# Patient Record
Sex: FEMALE | Race: BLACK OR AFRICAN AMERICAN | NOT HISPANIC OR LATINO | Employment: UNEMPLOYED
[De-identification: names, ages, dates, MRNs, and addresses within clinical notes are randomized per-mention and may not be internally consistent; named-entity substitution may affect disease eponyms.]

---

## 2021-03-31 ENCOUNTER — TRANSCRIBE ORDERS (OUTPATIENT)
Dept: OTHER | Age: 36
End: 2021-03-31

## 2021-03-31 DIAGNOSIS — H05.20 EXOPHTHALMOS: Primary | ICD-10-CM

## 2021-05-28 ENCOUNTER — TELEPHONE (OUTPATIENT)
Dept: OPHTHALMOLOGY | Facility: CLINIC | Age: 36
End: 2021-05-28

## 2023-05-02 ENCOUNTER — OFFICE VISIT (OUTPATIENT)
Dept: OPHTHALMOLOGY | Facility: CLINIC | Age: 38
End: 2023-05-02
Attending: OPHTHALMOLOGY
Payer: COMMERCIAL

## 2023-05-02 DIAGNOSIS — E05.00 GRAVES DISEASE: ICD-10-CM

## 2023-05-02 DIAGNOSIS — E07.9 THYROID EYE DISEASE: ICD-10-CM

## 2023-05-02 DIAGNOSIS — E05.00 PROPTOSIS DUE TO THYROID DISORDER: Primary | ICD-10-CM

## 2023-05-02 DIAGNOSIS — H57.89 THYROID EYE DISEASE: ICD-10-CM

## 2023-05-02 PROCEDURE — 92285 EXTERNAL OCULAR PHOTOGRAPHY: CPT | Mod: 26 | Performed by: OPHTHALMOLOGY

## 2023-05-02 PROCEDURE — 92285 EXTERNAL OCULAR PHOTOGRAPHY: CPT | Performed by: OPHTHALMOLOGY

## 2023-05-02 PROCEDURE — 99203 OFFICE O/P NEW LOW 30 MIN: CPT | Mod: GC | Performed by: OPHTHALMOLOGY

## 2023-05-02 PROCEDURE — G0463 HOSPITAL OUTPT CLINIC VISIT: HCPCS | Performed by: OPHTHALMOLOGY

## 2023-05-02 RX ORDER — ERGOCALCIFEROL 1.25 MG/1
50000 CAPSULE, LIQUID FILLED ORAL WEEKLY
COMMUNITY
Start: 2022-08-23 | End: 2023-11-21

## 2023-05-02 RX ORDER — LEVOTHYROXINE SODIUM 112 UG/1
112 TABLET ORAL DAILY
COMMUNITY
Start: 2022-08-28

## 2023-05-02 ASSESSMENT — CONF VISUAL FIELD
OD_NORMAL: 1
OS_NORMAL: 1
OS_INFERIOR_TEMPORAL_RESTRICTION: 0
OS_SUPERIOR_TEMPORAL_RESTRICTION: 0
OD_SUPERIOR_TEMPORAL_RESTRICTION: 0
OD_INFERIOR_NASAL_RESTRICTION: 0
OS_SUPERIOR_NASAL_RESTRICTION: 0
OD_INFERIOR_TEMPORAL_RESTRICTION: 0
OS_INFERIOR_NASAL_RESTRICTION: 0
METHOD: COUNTING FINGERS
OD_SUPERIOR_NASAL_RESTRICTION: 0

## 2023-05-02 ASSESSMENT — REFRACTION_WEARINGRX
OS_SPHERE: +2.00
OD_AXIS: 090
OD_SPHERE: +1.25
OS_CYLINDER: SPHERE
SPECS_TYPE: SVL
OD_CYLINDER: +0.25

## 2023-05-02 ASSESSMENT — EXTERNAL EXAM - RIGHT EYE: OD_EXAM: NORMAL

## 2023-05-02 ASSESSMENT — TONOMETRY
OS_IOP_MMHG: 19
OD_IOP_MMHG: 16
IOP_METHOD: ICARE SOLID/B JC

## 2023-05-02 ASSESSMENT — EXTERNAL EXAM - LEFT EYE: OS_EXAM: NORMAL

## 2023-05-02 ASSESSMENT — LAGOPHTHALMOS
OS_LAGOPHTHALMOS: 0
OD_LAGOPHTHALMOS: 1

## 2023-05-02 ASSESSMENT — VISUAL ACUITY
OD_SC+: -3
OS_SC: 20/30
OS_SC+: -1
OD_CC: 20/20
OS_CC: 20/20
METHOD: SNELLEN - LINEAR
OD_SC: 20/20

## 2023-05-02 ASSESSMENT — CUP TO DISC RATIO
OD_RATIO: 0.3
OS_RATIO: 0.3

## 2023-05-02 ASSESSMENT — MARGIN REFLEX DISTANCE
OD_MRD2: 8
OS_MRD1: 5
OD_MRD1: 5
OS_MRD2: 8

## 2023-05-02 NOTE — PROGRESS NOTES
HPI:   Diagnosed with hyperthyroidism in 2016, was initially treated with methimazole but it was not improving so she underwent total thyroidectomy in 2020. She has seen a general eye doctor since being diagnosed with thyroid problems. Does not have blurry vision in glasses from about a year ago. Has significant light sensitivity, eye watering, eye pain. She also has pressure/pain behind the eyes. She also has FBS and severe burning. Does not use eye drops. These symptoms have been progressively worsening. She also has bilateral eye bulging that improved since surgery (thyroidectomy).    Referring physician: Jane Pryor NP    Thyroid history:  Diagnosed when? 2016  VARGAS: no  Thyroidectomy: October 2020  Takes levothyroxine 112 mcg daily    TSI- 16.4 10/2019    Eye symptoms (since when): initial  Proptosis (better/worse/same since last visit): yes  Diplopia(better/worse/same since last visit): no  Eyelid retraction(better/worse/same since last visit): yes  Tearing(better/worse/same since last visit): yes  Redness (better/worse/same since last visit): yes  Pain ((better/worse/same since last visit): yes  Pain to move the eyes (better/worse/same since last visit): yes  Blurred vision: no    Ocular history:   Orbital decompression (date, details): no  Strabismus surgery (date, details): no  Eyelid surgery (date, details): no    Exam:   Stephanie (base): 100/26/26    Better/worse same: initial  Strabismus (better/worse/same): no  Eyelid retraction (better/worse/same): yes, initial    JORGE L Score:  1. Spontaneous orbital pain.     1  2. Gaze evoked orbital pain.     1  3. Eyelid swelling due to active thyroid eye disease  1  4. Eyelid erythema.      0  5. Conjunctival redness due to active thyroid eye disease . 1  6. Chemosis.        0  7. Inflammation of caruncle OR plica.   +/- 0-1    Patients assessed after follow-up can be scored out of 10 by  including items 8-10.    8. Increase of > 2mm in proptosis.    n/a   9.  Decrease in uniocular excursion in any direction of > 8 . n/a  10. Decrease of acuity equivalent to 1 Snellen line.  n/a    JORGE L SCORE = 4-5/7    Ramos Diplopia Score = 0  0 = no diplopia  1 = intermittent (when tired, upon waking, end of day etc)  2 = inconstant (extreme gazes)  3 = constant      Lanie Manuel is a 37 year old female with the following diagnoses:   1. Thyroid eye disease    2. Graves disease       Not clear that this patient is active.  Our suspicion is that she has chronic inactive thyroid eye disease with chronic symptoms of ocular surface exposure and some chronic orbital congestion. Discussed Tepezza for chronic thyroid eye disease versus observation with surgery.  Will check TSI and repeat exam in 4 months. If stable then consider orbital decompression surgery.       Vira Asher MD  PGY2 Ophthalmology    35 minutes were spent on the date of the encounter by me doing chart review, history and exam, documentation, and further activities as noted above    Complete documentation of historical and exam elements from today's encounter can be found in the full encounter summary report (not reduplicated in this progress note).  I personally obtained the chief complaint(s) and history of present illness.  I confirmed and edited as necessary the review of systems, past medical/surgical history, family history, social history, and examination findings as documented by others; and I examined the patient myself.  I personally reviewed the relevant tests, images, and reports as documented above.  I formulated and edited as necessary the assessment and plan and discussed the findings and management plan with the patient and family.  I personally reviewed the ophthalmic test(s) associated with this encounter, agree with the interpretation(s) as documented by the resident/fellow, and have edited the corresponding report(s) as necessary.     Ayden Ribeiro MD

## 2023-05-02 NOTE — NURSING NOTE
Chief Complaint(s) and History of Present Illness(es)     Graves Thyrotoxic Exoph            Laterality: both eyes    Associated symptoms: eye pain, blurred vision and headaches.  Negative for color vision changes    Comments: Diagnosed with Graves disease in 2016. Currently hypothyroid after thyroidectomy in 2020. Takes levothyroxine 112mg daily.   No current labs, will be getting them done this month but are not scheduled yet.  Had eye symptoms right after diagnosis (proptosis, dryness, tearing, pressure, light sensitivity, pain, headaches) constant since onset.  After surgery, proptosis not as bad but other symptoms remain. Vision D/N has gotten worse over last 1-2 years. No color vision changes.  No drops. No diplopia.          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  Inf: pt

## 2023-11-21 ENCOUNTER — HOSPITAL ENCOUNTER (INPATIENT)
Facility: HOSPITAL | Age: 38
LOS: 6 days | Discharge: HOME | End: 2023-11-27
Attending: EMERGENCY MEDICINE | Admitting: INTERNAL MEDICINE
Payer: COMMERCIAL

## 2023-11-21 ENCOUNTER — APPOINTMENT (OUTPATIENT)
Dept: CT IMAGING | Facility: HOSPITAL | Age: 38
End: 2023-11-21
Attending: PHYSICIAN ASSISTANT
Payer: COMMERCIAL

## 2023-11-21 DIAGNOSIS — N10 PYELONEPHRITIS, ACUTE: ICD-10-CM

## 2023-11-21 DIAGNOSIS — A41.9 SEPSIS (H): ICD-10-CM

## 2023-11-21 PROBLEM — E05.00 GRAVES' OPHTHALMOPATHY: Status: ACTIVE | Noted: 2019-11-27

## 2023-11-21 PROBLEM — O09.90 SUPERVISION OF HIGH RISK PREGNANCY, UNSPECIFIED, UNSPECIFIED TRIMESTER: Status: ACTIVE | Noted: 2019-07-05

## 2023-11-21 PROBLEM — E05.00 GRAVES DISEASE: Status: ACTIVE | Noted: 2017-06-07

## 2023-11-21 PROBLEM — E66.9 OBESITY (BMI 35.0-39.9 WITHOUT COMORBIDITY): Status: ACTIVE | Noted: 2023-08-18

## 2023-11-21 PROBLEM — E83.51 HYPOCALCEMIA: Status: ACTIVE | Noted: 2020-10-20

## 2023-11-21 PROBLEM — C73 THYROID CANCER (H): Chronic | Status: ACTIVE | Noted: 2020-10-20

## 2023-11-21 LAB
ALBUMIN SERPL BCG-MCNC: 4.2 G/DL (ref 3.5–5.2)
ALBUMIN UR-MCNC: 10 MG/DL
ALP SERPL-CCNC: 184 U/L (ref 40–150)
ALT SERPL W P-5'-P-CCNC: 30 U/L (ref 0–50)
ANION GAP SERPL CALCULATED.3IONS-SCNC: 15 MMOL/L (ref 7–15)
APPEARANCE UR: CLEAR
AST SERPL W P-5'-P-CCNC: 37 U/L (ref 0–45)
BACTERIA #/AREA URNS HPF: ABNORMAL /HPF
BASOPHILS # BLD AUTO: ABNORMAL 10*3/UL
BASOPHILS # BLD MANUAL: 0 10E3/UL (ref 0–0.2)
BASOPHILS NFR BLD AUTO: ABNORMAL %
BASOPHILS NFR BLD MANUAL: 0 %
BILIRUB DIRECT SERPL-MCNC: <0.2 MG/DL (ref 0–0.3)
BILIRUB SERPL-MCNC: 0.6 MG/DL
BILIRUB UR QL STRIP: NEGATIVE
BUN SERPL-MCNC: 9.5 MG/DL (ref 6–20)
CALCIUM SERPL-MCNC: 9.8 MG/DL (ref 8.6–10)
CHLORIDE SERPL-SCNC: 91 MMOL/L (ref 98–107)
COLOR UR AUTO: COLORLESS
CREAT SERPL-MCNC: 0.94 MG/DL (ref 0.51–0.95)
DEPRECATED HCO3 PLAS-SCNC: 26 MMOL/L (ref 22–29)
EGFRCR SERPLBLD CKD-EPI 2021: 78 ML/MIN/1.73M2
EOSINOPHIL # BLD AUTO: ABNORMAL 10*3/UL
EOSINOPHIL # BLD MANUAL: 0 10E3/UL (ref 0–0.7)
EOSINOPHIL NFR BLD AUTO: ABNORMAL %
EOSINOPHIL NFR BLD MANUAL: 0 %
ERYTHROCYTE [DISTWIDTH] IN BLOOD BY AUTOMATED COUNT: 11.9 % (ref 10–15)
GLUCOSE SERPL-MCNC: 120 MG/DL (ref 70–99)
GLUCOSE UR STRIP-MCNC: NEGATIVE MG/DL
HCG SERPL QL: NEGATIVE
HCT VFR BLD AUTO: 38.1 % (ref 35–47)
HGB BLD-MCNC: 13.5 G/DL (ref 11.7–15.7)
HGB UR QL STRIP: NEGATIVE
IMM GRANULOCYTES # BLD: ABNORMAL 10*3/UL
IMM GRANULOCYTES NFR BLD: ABNORMAL %
KETONES UR STRIP-MCNC: 10 MG/DL
LACTATE SERPL-SCNC: 1.1 MMOL/L (ref 0.7–2)
LACTATE SERPL-SCNC: 2.4 MMOL/L (ref 0.7–2)
LACTATE SERPL-SCNC: 2.4 MMOL/L (ref 0.7–2)
LEUKOCYTE ESTERASE UR QL STRIP: ABNORMAL
LIPASE SERPL-CCNC: 9 U/L (ref 13–60)
LYMPHOCYTES # BLD AUTO: ABNORMAL 10*3/UL
LYMPHOCYTES # BLD MANUAL: 1.4 10E3/UL (ref 0.8–5.3)
LYMPHOCYTES NFR BLD AUTO: ABNORMAL %
LYMPHOCYTES NFR BLD MANUAL: 6 %
MCH RBC QN AUTO: 29.1 PG (ref 26.5–33)
MCHC RBC AUTO-ENTMCNC: 35.4 G/DL (ref 31.5–36.5)
MCV RBC AUTO: 82 FL (ref 78–100)
MONOCYTES # BLD AUTO: ABNORMAL 10*3/UL
MONOCYTES # BLD MANUAL: 1 10E3/UL (ref 0–1.3)
MONOCYTES NFR BLD AUTO: ABNORMAL %
MONOCYTES NFR BLD MANUAL: 4 %
MUCOUS THREADS #/AREA URNS LPF: PRESENT /LPF
NEUTROPHILS # BLD AUTO: ABNORMAL 10*3/UL
NEUTROPHILS # BLD MANUAL: 21.4 10E3/UL (ref 1.6–8.3)
NEUTROPHILS NFR BLD AUTO: ABNORMAL %
NEUTROPHILS NFR BLD MANUAL: 90 %
NITRATE UR QL: POSITIVE
NRBC # BLD AUTO: 0 10E3/UL
NRBC BLD AUTO-RTO: 0 /100
PH UR STRIP: 7 [PH] (ref 5–7)
PLAT MORPH BLD: ABNORMAL
PLATELET # BLD AUTO: 392 10E3/UL (ref 150–450)
POTASSIUM SERPL-SCNC: 3.5 MMOL/L (ref 3.4–5.3)
PROT SERPL-MCNC: 8.7 G/DL (ref 6.4–8.3)
RBC # BLD AUTO: 4.64 10E6/UL (ref 3.8–5.2)
RBC MORPH BLD: ABNORMAL
RBC URINE: 5 /HPF
SODIUM SERPL-SCNC: 132 MMOL/L (ref 135–145)
SP GR UR STRIP: 1.03 (ref 1–1.03)
TSH SERPL DL<=0.005 MIU/L-ACNC: 1.23 UIU/ML (ref 0.3–4.2)
UROBILINOGEN UR STRIP-MCNC: <2 MG/DL
WBC # BLD AUTO: 23.8 10E3/UL (ref 4–11)
WBC URINE: 28 /HPF

## 2023-11-21 PROCEDURE — 82248 BILIRUBIN DIRECT: CPT | Performed by: PHYSICIAN ASSISTANT

## 2023-11-21 PROCEDURE — 250N000013 HC RX MED GY IP 250 OP 250 PS 637: Performed by: INTERNAL MEDICINE

## 2023-11-21 PROCEDURE — 87086 URINE CULTURE/COLONY COUNT: CPT | Performed by: PHYSICIAN ASSISTANT

## 2023-11-21 PROCEDURE — 120N000001 HC R&B MED SURG/OB

## 2023-11-21 PROCEDURE — 83605 ASSAY OF LACTIC ACID: CPT | Performed by: INTERNAL MEDICINE

## 2023-11-21 PROCEDURE — 83690 ASSAY OF LIPASE: CPT | Performed by: PHYSICIAN ASSISTANT

## 2023-11-21 PROCEDURE — 99223 1ST HOSP IP/OBS HIGH 75: CPT | Performed by: INTERNAL MEDICINE

## 2023-11-21 PROCEDURE — 258N000003 HC RX IP 258 OP 636: Performed by: PHYSICIAN ASSISTANT

## 2023-11-21 PROCEDURE — 250N000011 HC RX IP 250 OP 636: Mod: JZ | Performed by: PHYSICIAN ASSISTANT

## 2023-11-21 PROCEDURE — 80053 COMPREHEN METABOLIC PANEL: CPT | Performed by: PHYSICIAN ASSISTANT

## 2023-11-21 PROCEDURE — 250N000011 HC RX IP 250 OP 636: Performed by: EMERGENCY MEDICINE

## 2023-11-21 PROCEDURE — 87149 DNA/RNA DIRECT PROBE: CPT | Performed by: PHYSICIAN ASSISTANT

## 2023-11-21 PROCEDURE — 99285 EMERGENCY DEPT VISIT HI MDM: CPT | Mod: 25

## 2023-11-21 PROCEDURE — 250N000011 HC RX IP 250 OP 636: Mod: JZ | Performed by: INTERNAL MEDICINE

## 2023-11-21 PROCEDURE — 36415 COLL VENOUS BLD VENIPUNCTURE: CPT | Performed by: INTERNAL MEDICINE

## 2023-11-21 PROCEDURE — 83605 ASSAY OF LACTIC ACID: CPT | Performed by: PHYSICIAN ASSISTANT

## 2023-11-21 PROCEDURE — 36415 COLL VENOUS BLD VENIPUNCTURE: CPT | Performed by: PHYSICIAN ASSISTANT

## 2023-11-21 PROCEDURE — 258N000003 HC RX IP 258 OP 636: Performed by: INTERNAL MEDICINE

## 2023-11-21 PROCEDURE — 81001 URINALYSIS AUTO W/SCOPE: CPT | Performed by: PHYSICIAN ASSISTANT

## 2023-11-21 PROCEDURE — 74177 CT ABD & PELVIS W/CONTRAST: CPT

## 2023-11-21 PROCEDURE — 96375 TX/PRO/DX INJ NEW DRUG ADDON: CPT

## 2023-11-21 PROCEDURE — 96365 THER/PROPH/DIAG IV INF INIT: CPT | Mod: 59

## 2023-11-21 PROCEDURE — 87077 CULTURE AEROBIC IDENTIFY: CPT | Performed by: PHYSICIAN ASSISTANT

## 2023-11-21 PROCEDURE — 85027 COMPLETE CBC AUTOMATED: CPT | Performed by: PHYSICIAN ASSISTANT

## 2023-11-21 PROCEDURE — 85007 BL SMEAR W/DIFF WBC COUNT: CPT | Performed by: PHYSICIAN ASSISTANT

## 2023-11-21 PROCEDURE — 84443 ASSAY THYROID STIM HORMONE: CPT | Performed by: PHYSICIAN ASSISTANT

## 2023-11-21 PROCEDURE — 84703 CHORIONIC GONADOTROPIN ASSAY: CPT | Performed by: PHYSICIAN ASSISTANT

## 2023-11-21 PROCEDURE — 96361 HYDRATE IV INFUSION ADD-ON: CPT

## 2023-11-21 RX ORDER — IOPAMIDOL 755 MG/ML
90 INJECTION, SOLUTION INTRAVASCULAR ONCE
Status: COMPLETED | OUTPATIENT
Start: 2023-11-21 | End: 2023-11-21

## 2023-11-21 RX ORDER — SPIRONOLACTONE 50 MG/1
50 TABLET, FILM COATED ORAL EVERY MORNING
COMMUNITY

## 2023-11-21 RX ORDER — TRETINOIN 0.25 MG/G
CREAM TOPICAL AT BEDTIME
COMMUNITY

## 2023-11-21 RX ORDER — SPIRONOLACTONE 25 MG/1
50 TABLET ORAL EVERY MORNING
Status: DISCONTINUED | OUTPATIENT
Start: 2023-11-22 | End: 2023-11-27 | Stop reason: HOSPADM

## 2023-11-21 RX ORDER — AZELAIC ACID 0.15 G/G
GEL TOPICAL EVERY MORNING
COMMUNITY

## 2023-11-21 RX ORDER — ONDANSETRON 2 MG/ML
4 INJECTION INTRAMUSCULAR; INTRAVENOUS EVERY 6 HOURS PRN
Status: DISCONTINUED | OUTPATIENT
Start: 2023-11-21 | End: 2023-11-23

## 2023-11-21 RX ORDER — POLYETHYLENE GLYCOL 3350 17 G/17G
17 POWDER, FOR SOLUTION ORAL DAILY
Status: DISCONTINUED | OUTPATIENT
Start: 2023-11-21 | End: 2023-11-26

## 2023-11-21 RX ORDER — PHENTERMINE HYDROCHLORIDE 37.5 MG/1
37.5 CAPSULE ORAL EVERY MORNING
COMMUNITY

## 2023-11-21 RX ORDER — FENTANYL CITRATE 50 UG/ML
100 INJECTION, SOLUTION INTRAMUSCULAR; INTRAVENOUS ONCE
Status: COMPLETED | OUTPATIENT
Start: 2023-11-21 | End: 2023-11-21

## 2023-11-21 RX ORDER — VALACYCLOVIR HYDROCHLORIDE 500 MG/1
1000 TABLET, FILM COATED ORAL DAILY
Status: DISCONTINUED | OUTPATIENT
Start: 2023-11-21 | End: 2023-11-27 | Stop reason: HOSPADM

## 2023-11-21 RX ORDER — SODIUM CHLORIDE 9 MG/ML
INJECTION, SOLUTION INTRAVENOUS CONTINUOUS
Status: ACTIVE | OUTPATIENT
Start: 2023-11-21 | End: 2023-11-22

## 2023-11-21 RX ORDER — VALACYCLOVIR HYDROCHLORIDE 1 G/1
1000 TABLET, FILM COATED ORAL DAILY
COMMUNITY

## 2023-11-21 RX ORDER — SPIRONOLACTONE 100 MG/1
100 TABLET, FILM COATED ORAL
Status: DISCONTINUED | OUTPATIENT
Start: 2023-11-21 | End: 2023-11-27 | Stop reason: HOSPADM

## 2023-11-21 RX ORDER — PIPERACILLIN SODIUM, TAZOBACTAM SODIUM 3; .375 G/15ML; G/15ML
3.38 INJECTION, POWDER, LYOPHILIZED, FOR SOLUTION INTRAVENOUS ONCE
Status: COMPLETED | OUTPATIENT
Start: 2023-11-21 | End: 2023-11-21

## 2023-11-21 RX ORDER — MORPHINE SULFATE 4 MG/ML
4 INJECTION, SOLUTION INTRAMUSCULAR; INTRAVENOUS ONCE
Status: COMPLETED | OUTPATIENT
Start: 2023-11-21 | End: 2023-11-21

## 2023-11-21 RX ORDER — AMOXICILLIN 250 MG
1 CAPSULE ORAL 2 TIMES DAILY PRN
Status: DISCONTINUED | OUTPATIENT
Start: 2023-11-21 | End: 2023-11-27 | Stop reason: HOSPADM

## 2023-11-21 RX ORDER — HYDROMORPHONE HYDROCHLORIDE 1 MG/ML
0.5 INJECTION, SOLUTION INTRAMUSCULAR; INTRAVENOUS; SUBCUTANEOUS ONCE
Status: COMPLETED | OUTPATIENT
Start: 2023-11-21 | End: 2023-11-21

## 2023-11-21 RX ORDER — SPIRONOLACTONE 50 MG/1
100 TABLET, FILM COATED ORAL
COMMUNITY

## 2023-11-21 RX ORDER — LIDOCAINE 40 MG/G
CREAM TOPICAL
Status: DISCONTINUED | OUTPATIENT
Start: 2023-11-21 | End: 2023-11-27 | Stop reason: HOSPADM

## 2023-11-21 RX ORDER — AMOXICILLIN 250 MG
2 CAPSULE ORAL 2 TIMES DAILY PRN
Status: DISCONTINUED | OUTPATIENT
Start: 2023-11-21 | End: 2023-11-27 | Stop reason: HOSPADM

## 2023-11-21 RX ORDER — PIPERACILLIN SODIUM, TAZOBACTAM SODIUM 3; .375 G/15ML; G/15ML
3.38 INJECTION, POWDER, LYOPHILIZED, FOR SOLUTION INTRAVENOUS EVERY 8 HOURS
Status: DISCONTINUED | OUTPATIENT
Start: 2023-11-21 | End: 2023-11-23

## 2023-11-21 RX ORDER — ACETAMINOPHEN 325 MG/1
650 TABLET ORAL EVERY 4 HOURS PRN
Status: DISCONTINUED | OUTPATIENT
Start: 2023-11-21 | End: 2023-11-22

## 2023-11-21 RX ORDER — CALCIUM CARBONATE 500 MG/1
1000 TABLET, CHEWABLE ORAL 4 TIMES DAILY PRN
Status: DISCONTINUED | OUTPATIENT
Start: 2023-11-21 | End: 2023-11-26

## 2023-11-21 RX ORDER — HYDROMORPHONE HYDROCHLORIDE 1 MG/ML
0.5 INJECTION, SOLUTION INTRAMUSCULAR; INTRAVENOUS; SUBCUTANEOUS
Status: DISCONTINUED | OUTPATIENT
Start: 2023-11-21 | End: 2023-11-27 | Stop reason: HOSPADM

## 2023-11-21 RX ORDER — LEVOTHYROXINE SODIUM 112 UG/1
112 TABLET ORAL DAILY
Status: DISCONTINUED | OUTPATIENT
Start: 2023-11-21 | End: 2023-11-27 | Stop reason: HOSPADM

## 2023-11-21 RX ORDER — ONDANSETRON 4 MG/1
4 TABLET, ORALLY DISINTEGRATING ORAL EVERY 6 HOURS PRN
Status: DISCONTINUED | OUTPATIENT
Start: 2023-11-21 | End: 2023-11-23

## 2023-11-21 RX ADMIN — IOPAMIDOL 90 ML: 755 INJECTION, SOLUTION INTRAVENOUS at 11:23

## 2023-11-21 RX ADMIN — ONDANSETRON 4 MG: 2 INJECTION INTRAMUSCULAR; INTRAVENOUS at 14:05

## 2023-11-21 RX ADMIN — SPIRONOLACTONE 100 MG: 100 TABLET ORAL at 16:30

## 2023-11-21 RX ADMIN — SODIUM CHLORIDE 1000 ML: 9 INJECTION, SOLUTION INTRAVENOUS at 10:54

## 2023-11-21 RX ADMIN — FENTANYL CITRATE 100 MCG: 50 INJECTION, SOLUTION INTRAMUSCULAR; INTRAVENOUS at 09:38

## 2023-11-21 RX ADMIN — PIPERACILLIN AND TAZOBACTAM 3.38 G: 3; .375 INJECTION, POWDER, FOR SOLUTION INTRAVENOUS at 10:52

## 2023-11-21 RX ADMIN — MORPHINE SULFATE 4 MG: 4 INJECTION, SOLUTION INTRAMUSCULAR; INTRAVENOUS at 11:54

## 2023-11-21 RX ADMIN — SODIUM CHLORIDE: 9 INJECTION, SOLUTION INTRAVENOUS at 14:02

## 2023-11-21 RX ADMIN — HYDROMORPHONE HYDROCHLORIDE 0.5 MG: 1 INJECTION, SOLUTION INTRAMUSCULAR; INTRAVENOUS; SUBCUTANEOUS at 14:02

## 2023-11-21 RX ADMIN — SODIUM CHLORIDE 1000 ML: 9 INJECTION, SOLUTION INTRAVENOUS at 09:36

## 2023-11-21 RX ADMIN — HYDROMORPHONE HYDROCHLORIDE 0.5 MG: 1 INJECTION, SOLUTION INTRAMUSCULAR; INTRAVENOUS; SUBCUTANEOUS at 20:30

## 2023-11-21 RX ADMIN — HYDROMORPHONE HYDROCHLORIDE 0.5 MG: 1 INJECTION, SOLUTION INTRAMUSCULAR; INTRAVENOUS; SUBCUTANEOUS at 10:27

## 2023-11-21 RX ADMIN — LEVOTHYROXINE SODIUM 112 MCG: 0.11 TABLET ORAL at 15:02

## 2023-11-21 RX ADMIN — HYDROMORPHONE HYDROCHLORIDE 0.5 MG: 1 INJECTION, SOLUTION INTRAMUSCULAR; INTRAVENOUS; SUBCUTANEOUS at 16:37

## 2023-11-21 RX ADMIN — VALACYCLOVIR HYDROCHLORIDE 1000 MG: 500 TABLET, FILM COATED ORAL at 20:56

## 2023-11-21 RX ADMIN — ACETAMINOPHEN 650 MG: 325 TABLET ORAL at 17:35

## 2023-11-21 RX ADMIN — PIPERACILLIN AND TAZOBACTAM 3.38 G: 3; .375 INJECTION, POWDER, LYOPHILIZED, FOR SOLUTION INTRAVENOUS at 16:30

## 2023-11-21 RX ADMIN — SODIUM CHLORIDE 1000 ML: 9 INJECTION, SOLUTION INTRAVENOUS at 12:51

## 2023-11-21 ASSESSMENT — ACTIVITIES OF DAILY LIVING (ADL)
ADLS_ACUITY_SCORE: 35
ADLS_ACUITY_SCORE: 35
ADLS_ACUITY_SCORE: 20
ADLS_ACUITY_SCORE: 35
ADLS_ACUITY_SCORE: 35
ADLS_ACUITY_SCORE: 20
ADLS_ACUITY_SCORE: 35

## 2023-11-21 NOTE — ED TRIAGE NOTES
Patient to triage via wheelchair, states 4 day history of lower abdominal pain, NV loose stools. No blood in stool. Has not been eating. Very restless in chair

## 2023-11-21 NOTE — ED NOTES
"Emergency Department Midlevel Supervisory Note     I personally saw the patient and performed a substantive portion of the visit including all aspects of the medical decision making.    ED Course:  9:37 AM  Ascencion Little PA-C staffed patient with me. I agree with their assessment and plan of management, and I will see the patient.  9:46 AM  I met with the patient to introduce myself, gather additional history, perform my initial exam, and discuss the plan.     Brief HPI:     Lanie Manuel is a 38 year old female who presents for evaluation of RLQ abdominal pain, nausea, and vomiting since 11/17. Denies fever, urinary symptoms, hematemesis, hematochezia.     I, Amina Contreras, am serving as a scribe to document services personally performed by Rc Higgins MD based on my observations and the provider's statements to me.   I, Rc Higigns MD, attest that Amina Contreras was acting in a scribe capacity, has observed my performance of the services and has documented them in accordance with my direction.    Brief Physical Exam:    Vitals: BP (!) 198/81   Pulse 95   Temp 98.9  F (37.2  C) (Oral)   Resp 22   Ht 1.626 m (5' 4\")   Wt 95.3 kg (210 lb)   LMP 11/14/2023   SpO2 99%   BMI 36.05 kg/m    General: Appears in no acute distress, awake, alert, interactive.  Eyes: Conjunctivae non-injected. Sclera anicteric.  HENT: Atraumatic.  Neck: Supple.  Respiratory/Chest: Respiration unlabored.  Abdomen: non distended, right sided abdominal tenderness  Musculoskeletal: Normal extremities. No edema or erythema.  Skin: Normal color. No rash or diaphoresis.  Neurologic: Face symmetric, moves all extremities spontaneously. Speech clear.  Psychiatric: Oriented to person, place, and time. Affect appropriate.       MDM:    ED Course as of 11/21/23 1312   Tue Nov 21, 2023   0927 Patient complains of diffuse abdominal pain, as well as pain across her chest.  2-year-old female, with past medical history of Graves' " disease, thyroid disorder, who presents emergency department for evaluation of diffuse abdominal pain, nausea and vomiting.  She reported has not been eating very much.  Very restlessly on examination.  On arrival here, borderline hypotensive, tachycardic and borderline febrile.  On exam has diffuse abdominal pain, without any significant rebound or guarding.  Pain primarily isolated over the right lower quadrant.  Patient does still have her appendix in place, she does not have her gallbladder.  Denies any other significant past medical history.  Denies shortness of breath.  Differential at this point is exceedingly broad given diffuse nature of pain includes possible retained stone, pancreatitis, bowel obstruction, gastritis, diverticulitis, less likely perforation.  Additional differential possible for appendicitis.  No history of kidney stones in the past, however patient does have right-sided lower abdominal pain, with some pain radiating into her right lower back, not particularly consistent with CVA tenderness, however will obtain urinalysis.  Plan for basic labs, pain medications, CT abdomen pelvis.  She denies any chance of pregnancy.   0943 Lactate here in the emergency department mildly elevated at 2.4.  Could be secondary to nausea vomiting, dehydration   0945 I met with patient and her hypotension is resolved.  Pain right lower quadrant.  Differential includes appendicitis, ileitis, PID, ovarian process, diverticulitis, infected kidney stone, PID, UTI   0949 Significant leukocytosis here in the emergency department with elevated lactate, blood cultures ordered, she will get the full 30/kg of sepsis fluids   0955 HCG Qualitative Serum: Negative   1035 TSH here completely unremarkable   1126 I have independently reviewed the CT abdomen pelvis, I do not appreciate any significant perforations, though pending final radiology read.   1218 UA here does show positive for nitrites, leukocyte esterase, some  bacteria, as well as some white blood cells, concerning for possible pyelonephritis given patient's abdominal pain   1230 CT Abdomen Pelvis w Contrast  Patient with severe right-sided pyelonephritis without abscess, no renal stones or obstruction.  Otherwise normal CT scan.   1234 Lactic unchanged here despite 30ml/kg bolus here.  CT scan shows right-sided severe pyelonephritis without any abscess.  Patient given Zosyn originally here in the emergency department.  Discussed plan for likely admission giving significant discomfort, and continued elevated lactate.  Laboratory studies otherwise unremarkable.   CT shows changes of pyelonephritis  Concerns for sepsis we will plan to admit for IV antibiotics.  Hypotension resolved.  Given sepsis boluses of fluid but still has elevated lactic acid.  Plan for admission.  Zosyn given.    1. Pyelonephritis, acute    2. Sepsis (H)        Labs and Imaging:  Results for orders placed or performed during the hospital encounter of 11/21/23   CT Abdomen Pelvis w Contrast    Impression    IMPRESSION:   1.  Severe right pyelonephritis without abscess.    2.  No renal stones or obstruction.   UA with Microscopic reflex to Culture    Specimen: Urine, Clean Catch   Result Value Ref Range    Color Urine Colorless Colorless, Straw, Light Yellow, Yellow    Appearance Urine Clear Clear    Glucose Urine Negative Negative mg/dL    Bilirubin Urine Negative Negative    Ketones Urine 10 (A) Negative mg/dL    Specific Gravity Urine 1.033 (H) 1.001 - 1.030    Blood Urine Negative Negative    pH Urine 7.0 5.0 - 7.0    Protein Albumin Urine 10 (A) Negative mg/dL    Urobilinogen Urine <2.0 <2.0 mg/dL    Nitrite Urine Positive (A) Negative    Leukocyte Esterase Urine 250 Susanne/uL (A) Negative    Bacteria Urine Few (A) None Seen /HPF    Mucus Urine Present (A) None Seen /LPF    RBC Urine 5 (H) <=2 /HPF    WBC Urine 28 (H) <=5 /HPF   Lactic acid whole blood   Result Value Ref Range    Lactic Acid 2.4 (H)  0.7 - 2.0 mmol/L   Basic metabolic panel   Result Value Ref Range    Sodium 132 (L) 135 - 145 mmol/L    Potassium 3.5 3.4 - 5.3 mmol/L    Chloride 91 (L) 98 - 107 mmol/L    Carbon Dioxide (CO2) 26 22 - 29 mmol/L    Anion Gap 15 7 - 15 mmol/L    Urea Nitrogen 9.5 6.0 - 20.0 mg/dL    Creatinine 0.94 0.51 - 0.95 mg/dL    GFR Estimate 78 >60 mL/min/1.73m2    Calcium 9.8 8.6 - 10.0 mg/dL    Glucose 120 (H) 70 - 99 mg/dL   Hepatic function panel   Result Value Ref Range    Protein Total 8.7 (H) 6.4 - 8.3 g/dL    Albumin 4.2 3.5 - 5.2 g/dL    Bilirubin Total 0.6 <=1.2 mg/dL    Alkaline Phosphatase 184 (H) 40 - 150 U/L    AST 37 0 - 45 U/L    ALT 30 0 - 50 U/L    Bilirubin Direct <0.20 0.00 - 0.30 mg/dL   Result Value Ref Range    Lipase 9 (L) 13 - 60 U/L   HCG qualitative   Result Value Ref Range    hCG Serum Qualitative Negative Negative   CBC with platelets and differential   Result Value Ref Range    WBC Count 23.8 (H) 4.0 - 11.0 10e3/uL    RBC Count 4.64 3.80 - 5.20 10e6/uL    Hemoglobin 13.5 11.7 - 15.7 g/dL    Hematocrit 38.1 35.0 - 47.0 %    MCV 82 78 - 100 fL    MCH 29.1 26.5 - 33.0 pg    MCHC 35.4 31.5 - 36.5 g/dL    RDW 11.9 10.0 - 15.0 %    Platelet Count 392 150 - 450 10e3/uL    % Neutrophils      % Lymphocytes      % Monocytes      % Eosinophils      % Basophils      % Immature Granulocytes      NRBCs per 100 WBC 0 <1 /100    Absolute Neutrophils      Absolute Lymphocytes      Absolute Monocytes      Absolute Eosinophils      Absolute Basophils      Absolute Immature Granulocytes      Absolute NRBCs 0.0 10e3/uL   TSH with free T4 reflex   Result Value Ref Range    TSH 1.23 0.30 - 4.20 uIU/mL   Lactic acid whole blood   Result Value Ref Range    Lactic Acid 2.4 (H) 0.7 - 2.0 mmol/L   Manual Differential   Result Value Ref Range    % Neutrophils 90 %    % Lymphocytes 6 %    % Monocytes 4 %    % Eosinophils 0 %    % Basophils 0 %    Absolute Neutrophils 21.4 (H) 1.6 - 8.3 10e3/uL    Absolute Lymphocytes 1.4 0.8 -  5.3 10e3/uL    Absolute Monocytes 1.0 0.0 - 1.3 10e3/uL    Absolute Eosinophils 0.0 0.0 - 0.7 10e3/uL    Absolute Basophils 0.0 0.0 - 0.2 10e3/uL    RBC Morphology Confirmed RBC Indices     Platelet Assessment  Automated Count Confirmed. Platelet morphology is normal.     Automated Count Confirmed. Platelet morphology is normal.     I have reviewed the relevant laboratory and radiology studies    Procedures:  I was present for the key portions of this procedure: none    Rc Higgins MD   [facility] EMERGENCY DEPARTMENT  [address] [place] [id number] [phone]     Rc Higgins MD  11/21/23 2556

## 2023-11-21 NOTE — PHARMACY-ADMISSION MEDICATION HISTORY
Pharmacist Admission Medication History    Admission medication history is complete. The information provided in this note is only as accurate as the sources available at the time of the update.    Information Source(s): Patient, Clinic records, and [facility]    Pertinent Information: Patient stated that she has     Changes made to PTA medication list:  Added: all meds are new to the [facility]  Deleted: None  Changed: None         Allergies reviewed with patient and updates made in EHR: yes    Medication History Completed By: Fredrick Calabrese [facility] 11/21/2023 11:41 AM    [facility] Med List   Medication Sig Last Dose    azelaic acid (FINACIA) 15 % external gel Apply topically every morning Past Week    levothyroxine (SYNTHROID/LEVOTHROID) 112 MCG tablet Take 112 mcg by mouth daily Past Week    phentermine (ADIPEX-P) 37.5 MG capsule Take 37.5 mg by mouth every morning Past Week    spironolactone (ALDACTONE) 50 MG tablet Take 50 mg by mouth every morning Past Week    spironolactone (ALDACTONE) 50 MG tablet Take 100 mg by mouth daily (with dinner) Past Week    tretinoin (RETIN-A) 0.025 % external cream Apply topically at bedtime Past Week    valACYclovir (VALTREX) 1000 mg tablet Take 1,000 mg by mouth daily Past Week

## 2023-11-21 NOTE — ED PROVIDER NOTES
EMERGENCY DEPARTMENT ENCOUNTER      NAME: Lanie Manuel  AGE: 38 year old female  YOB: 1985  MRN: [id number]  EVALUATION DATE & TIME: No admission date for patient encounter.    PCP: Jane Pryor (Inactive)    ED PROVIDER: Reno Little PA-C      Chief Complaint   Patient presents with    Abdominal Pain         FINAL IMPRESSION:  1. Pyelonephritis, acute    2. Sepsis (H)        ED COURSE & MEDICAL DECISION MAKING:    Pertinent Labs & Imaging studies reviewed. (See chart for details)  9:16 AM I met the patient and performed my initial interview and exam. Staffed with Dr. Higgins  4489 Significant leukocytosis. Cultures and broad spectrum abx ordered.   11:25 AM patient returned from CT, still having diffuse abdominal pain, do not have imaging back here, will give another dose of pain meds.  12:33 PM Patient updated on laboratory results and imaging, plan for likely admission.   12:41 PM Discussed possible transfer, patient declines. Page for admission.  12:49 PM Discussed case with Dr. Rudd who accepts patient for admission to med-surg.      38 year old female presents to the Emergency Department for evaluation of abdominal pain, shortness of breath    ED Course as of 11/21/23 1241   Tue Nov 21, 2023   9322 Patient complains of diffuse abdominal pain, as well as pain across her chest.  38-year-old female, with past medical history of Graves' disease, thyroid disorder, who presents emergency department for evaluation of diffuse abdominal pain, nausea and vomiting.  She reported has not been eating very much.  Very restlessly on examination.  On arrival here, borderline hypotensive, tachycardic and borderline febrile.  On exam has diffuse abdominal pain, without any significant rebound or guarding.  Pain primarily isolated over the right lower quadrant.  Patient does still have her appendix in place, she does not have her gallbladder.  Denies any other significant past medical history.   Denies shortness of breath.  Differential at this point is exceedingly broad given diffuse nature of pain includes possible retained stone, pancreatitis, bowel obstruction, gastritis, diverticulitis, less likely perforation.  Additional differential possible for appendicitis.  No history of kidney stones in the past, however patient does have right-sided lower abdominal pain, with some pain radiating into her right lower back, not particularly consistent with CVA tenderness, however will obtain urinalysis.  Plan for basic labs, pain medications, CT abdomen pelvis.  She denies any chance of pregnancy.   0943 Lactate here in the emergency department mildly elevated at 2.4.  Could be secondary to nausea vomiting, dehydration   0945 I met with patient and her hypotension is resolved.  Pain right lower quadrant.  Differential includes appendicitis, ileitis, PID, ovarian process, diverticulitis, infected kidney stone, PID, UTI   0949 Significant leukocytosis here in the emergency department with elevated lactate, blood cultures ordered, she will get the full 30/kg of sepsis fluids   0955 HCG Qualitative Serum: Negative   1035 TSH here completely unremarkable   1126 I have independently reviewed the CT abdomen pelvis, I do not appreciate any significant perforations, though pending final radiology read.   1218 UA here does show positive for nitrites, leukocyte esterase, some bacteria, as well as some white blood cells, concerning for possible pyelonephritis given patient's abdominal pain   1230 CT Abdomen Pelvis w Contrast  Patient with severe right-sided pyelonephritis without abscess, no renal stones or obstruction.  Otherwise normal CT scan.   1234 Lactic unchanged here despite 30ml/kg bolus here.  CT scan shows right-sided severe pyelonephritis without any abscess.  Patient given Zosyn originally here in the emergency department.  Discussed plan for likely admission giving significant discomfort, and continued elevated  lactate.  Laboratory studies otherwise unremarkable.    Case discussed with hospital medicine, excepted patient for admission for ongoing pyonephritis given persistently elevated lactate level, after fluid bolus.  Broad-spectrum antibiotics ordered here.  Patient will be admitted.  Final diagnosis sepsis secondary to right-sided pyelonephritis.       Medical Decision Making    History:  Supplemental history from: Documented in chart, if applicable  External Record(s) reviewed: Outpatient Record: He has outpatient family medicine visit from 08/18/2023, office visit from 05/4/2023, office visit from 05/2/2023    Work Up:  Chart documentation includes differential considered and any EKGs or imaging independently interpreted by provider, where specified.  In additional to work up documented, I considered the following work up: Documented in chart, if applicable.    External consultation:  Discussion of management with another provider: Documented in chart, if applicable    Complicating factors:  Care impacted by chronic illness: Other: Graves' disease, thyroid cancer, sickle cell.  Care affected by social determinants of health: N/A    Disposition considerations: Admit.    [facility] Documentation:   CMS Diagnoses: Elevated Lactate of 2.4 and no sepsis note found - Delete this reminder and enter the sepsis note or '.edcms' before signing chart.>>>The patient has signs of Severe Sepsis        If one the following conditions is present, a 30 mL/kg bolus is recommended as part of the 6 hour bundle (IBW can be used for BMI >30, or document refusal/contraindication):      1.   Initial hypotension  defined as 2 bps < 90 or map < 65 in the 6hrs before or 3hrs after time zero.     2.  Lactate >4.      The patient has signs of Severe Sepsis as evidenced by:    1. 2 SIRS criteria, AND  2. Suspected infection, AND   3. Organ dysfunction: Lactic Acidosis with value >2.0    Time severe sepsis diagnosis confirmed: 0943   11/21/23 as this was the time when Lactate resulted, and the level was > 2.0    3 Hour Severe Sepsis Bundle Completion:    1. Initial Lactic Acid Result:   Recent Labs   Lab Test 11/21/23  1225 11/21/23  0936   LACT 2.4* 2.4*     2. Blood Cultures before Antibiotics: Yes  3. Broad Spectrum Antibiotics Administered:  yes       Anti-infectives (From admission through now)      Start     Dose/Rate Route Frequency Ordered Stop    11/21/23 1000  piperacillin-tazobactam (ZOSYN) 3.375 g vial to attach to  mL bag         3.375 g  over 30 Minutes Intravenous ONCE 11/21/23 0949 11/21/23 1154            4. Is initial hypotension present?     Yes. (Definition - 2 SBPs <90, MAP <65, or decrease > 40 from baseline due to infection w/in 3 hrs of each other during the time period of 6 hrs before and 3 hrs after time zero)   Full 30 mL/kg bolus given (see amount below).    BMI Readings from Last 1 Encounters:   11/21/23 36.05 kg/m      30 mL/kg fluids based on weight: 2,860 mL  30 mL/kg fluids based on IBW (must be >= 60 inches tall): 1,640 mL                    Severe Sepsis reassessment:  1. Repeat Lactic Acid Level within 6 hours of time zero: 2.4 at 1225  2. MAP>65 after initial IVF bolus, will continue to monitor fluid status and vital signs          At the conclusion of the encounter I discussed the results of all of the tests and the disposition. The questions were answered. The patient or family acknowledged understanding and was agreeable with the care plan.     Critical care 120 minutes excluding separately billable procedures.  Includes bedside management, time reviewing test results, review of records, discussing thecase with staff, documenting the medical record and time spent with family members (or surrogate decision makers) discussing specific treatment issues.      MEDICATIONS GIVEN IN THE EMERGENCY:  Medications   sodium chloride 0.9% BOLUS 1,000 mL (0 mLs Intravenous Stopped 11/21/23 1054)   fentaNYL (PF)  (SUBLIMAZE) injection 100 mcg (100 mcg Intravenous $Given 23 3720)   sodium chloride 0.9% BOLUS 2,000 mL (1,000 mLs Intravenous $New Bag 23 1054)   piperacillin-tazobactam (ZOSYN) 3.375 g vial to attach to  mL bag (0 g Intravenous Stopped 23 1154)   HYDROmorphone (PF) (DILAUDID) injection 0.5 mg (0.5 mg Intravenous $Given 23 1027)   iopamidol (ISOVUE-370) solution 90 mL (90 mLs Intravenous $Given 23 1123)   morphine (PF) injection 4 mg (4 mg Intravenous $Given 23 1154)       NEW PRESCRIPTIONS STARTED AT TODAY'S ER VISIT  New Prescriptions    No medications on file       =================================================================    HPI    Patient information was obtained from: Patient     Use of : N/A      Lanie Manuel is a 38 year old female with a pertinent history of Graves' disease, thyroid cancer, sickle cell trait, obesity, hypokalemia who presents to this ED for evaluation of diffuse abdominal pain, nausea and vomiting.  Patient reports symptoms been ongoing since Friday.  Pain worse today.  History of hypothyroidism in the past, on levothyroxine.  No recent changes in her doses.  Denies any fevers at home.  Has had nausea and vomiting at home.  Complaining of right-sided lower abdominal pain, does still have her appendix.  Reportedly had her gallbladder removed many years ago.  Denies any history of diabetes.  Denies any history of cardiac problems.  No fevers.  Additionally complaining of some pain radiating up into her right lower back, however not up in the lower flank.  No urinary symptoms.  No blood in her vomit or stool.    PAST MEDICAL HISTORY:  Past Medical History:   Diagnosis Date    Graves disease        PAST SURGICAL HISTORY:  Past Surgical History:   Procedure Laterality Date     SECTION      THYROIDECTOMY              CURRENT MEDICATIONS:    azelaic acid (FINACIA) 15 % external gel  levothyroxine  "(SYNTHROID/LEVOTHROID) 112 MCG tablet  phentermine (ADIPEX-P) 37.5 MG capsule  spironolactone (ALDACTONE) 50 MG tablet  spironolactone (ALDACTONE) 50 MG tablet  tretinoin (RETIN-A) 0.025 % external cream  valACYclovir (VALTREX) 1000 mg tablet         ALLERGIES:  No Known Allergies    FAMILY HISTORY:  No family history on file.    SOCIAL HISTORY:   Social History     Socioeconomic History    Marital status: Single   Tobacco Use    Smoking status: Never    Smokeless tobacco: Never   Substance and Sexual Activity    Alcohol use: No       VITALS:  BP (!) 157/100   Pulse 97   Temp 98.9  F (37.2  C) (Oral)   Resp 22   Ht 1.626 m (5' 4\")   Wt 95.3 kg (210 lb)   LMP 11/14/2023   SpO2 100%   BMI 36.05 kg/m      PHYSICAL EXAM    Physical Exam  Vitals and nursing note reviewed.   Constitutional:       Appearance: Normal appearance. She is normal weight. She is not diaphoretic.      Comments: Uncomfortable appearing, anxious   HENT:      Right Ear: External ear normal.      Left Ear: External ear normal.   Eyes:      Conjunctiva/sclera: Conjunctivae normal.   Cardiovascular:      Rate and Rhythm: Regular rhythm. Tachycardia present.   Pulmonary:      Effort: Pulmonary effort is normal. No respiratory distress.      Breath sounds: No stridor. No wheezing or rales.   Abdominal:      General: Abdomen is flat. There is no distension.      Palpations: Abdomen is soft. There is no mass.      Tenderness: There is generalized abdominal tenderness. There is no right CVA tenderness, left CVA tenderness, guarding or rebound.      Comments: Pain to the right lower side of the flank, however much lower than the CVA.    Skin:     Findings: No erythema or rash.   Neurological:      Mental Status: She is alert. Mental status is at baseline.           LAB:  All pertinent labs reviewed and interpreted.  Labs Ordered and Resulted from Time of ED Arrival to Time of ED Departure   ROUTINE UA WITH MICROSCOPIC REFLEX TO CULTURE - Abnormal     "   Result Value    Color Urine Colorless      Appearance Urine Clear      Glucose Urine Negative      Bilirubin Urine Negative      Ketones Urine 10 (*)     Specific Gravity Urine 1.033 (*)     Blood Urine Negative      pH Urine 7.0      Protein Albumin Urine 10 (*)     Urobilinogen Urine <2.0      Nitrite Urine Positive (*)     Leukocyte Esterase Urine 250 Susanne/uL (*)     Bacteria Urine Few (*)     Mucus Urine Present (*)     RBC Urine 5 (*)     WBC Urine 28 (*)    LACTIC ACID WHOLE BLOOD - Abnormal    Lactic Acid 2.4 (*)    BASIC METABOLIC PANEL - Abnormal    Sodium 132 (*)     Potassium 3.5      Chloride 91 (*)     Carbon Dioxide (CO2) 26      Anion Gap 15      Urea Nitrogen 9.5      Creatinine 0.94      GFR Estimate 78      Calcium 9.8      Glucose 120 (*)    HEPATIC FUNCTION PANEL - Abnormal    Protein Total 8.7 (*)     Albumin 4.2      Bilirubin Total 0.6      Alkaline Phosphatase 184 (*)     AST 37      ALT 30      Bilirubin Direct <0.20     LIPASE - Abnormal    Lipase 9 (*)    CBC WITH PLATELETS AND DIFFERENTIAL - Abnormal    WBC Count 23.8 (*)     RBC Count 4.64      Hemoglobin 13.5      Hematocrit 38.1      MCV 82      MCH 29.1      MCHC 35.4      RDW 11.9      Platelet Count 392      % Neutrophils        % Lymphocytes        % Monocytes        % Eosinophils        % Basophils        % Immature Granulocytes        NRBCs per 100 WBC 0      Absolute Neutrophils        Absolute Lymphocytes        Absolute Monocytes        Absolute Eosinophils        Absolute Basophils        Absolute Immature Granulocytes        Absolute NRBCs 0.0     LACTIC ACID WHOLE BLOOD - Abnormal    Lactic Acid 2.4 (*)    DIFFERENTIAL - Abnormal    % Neutrophils 90      % Lymphocytes 6      % Monocytes 4      % Eosinophils 0      % Basophils 0      Absolute Neutrophils 21.4 (*)     Absolute Lymphocytes 1.4      Absolute Monocytes 1.0      Absolute Eosinophils 0.0      Absolute Basophils 0.0      RBC Morphology Confirmed RBC Indices       Platelet Assessment        Value: Automated Count Confirmed. Platelet morphology is normal.   HCG QUALITATIVE PREGNANCY - Normal    hCG Serum Qualitative Negative     TSH WITH FREE T4 REFLEX - Normal    TSH 1.23     BLOOD CULTURE   BLOOD CULTURE   URINE CULTURE       RADIOLOGY:  Reviewed all pertinent imaging. Please see official radiology report.  CT Abdomen Pelvis w Contrast   Final Result   IMPRESSION:    1.  Severe right pyelonephritis without abscess.      2.  No renal stones or obstruction.        PROCEDURES:   None.     Reno Little PA-C  Emergency Medicine  [facility] EMERGENCY DEPARTMENT  [facility] [facility] [id number] [phone]  Dept: [phone]       Reno Little PA-C  11/21/23 1315       Reno Little PA-C  11/21/23 6304

## 2023-11-21 NOTE — LETTER
November 27, 2023      To Whom It May Concern:      Lanie Manuel was admitted to [facility] 11/21/2023 - 11/27/2023.   I expect her condition to improve over the next few days.  She may return to work/school when improved.    Sincerely,        Ariadne Vences MD

## 2023-11-21 NOTE — H&P
ADMISSION HISTORY & PHYSICAL    CODE STATUS:  No Order    Jane Pryor (Inactive), None    Patient YOB: 1985  Admit date: 11/21/2023  Date of Service: 11/21/2023    ASSESSMENT AND PLAN:  Patient is a 38 year old female with PMH significant for Graves disease, s/p total thyroidectomy with parathyroid autotransplant, sickle cell trait, obesity who presented to our ED for evaluationof 4 days of abdominal pain, nausea and vomiting.     Abdominal pain  Acute pyelonephritis, right  Sepsis due to acute pyelonephritis  -- Presenting with 4 days of right sided abdominal pain. Abnormal UA, leucocytosis, CT abd/pelvis findings all consistent with acute pyelonephritis. CT showed findings of severe pyelonephritis without abscess or stone/obstruction  -- Blood culture and urine culture obtained in ED  -- Cont IV zosyn with the goal of switching antibiotics once microbiology is available  -- IV dilaudid for pain control    Lactic acidosis  -- Due to above. Received NS bolus fo 3L in ED.   -- Cont NS @125ml/hr  -- Trend lactate    H/O Grave's disease  -- S/P total thyroidectomy with  parathyroid autotransplant  -- Cont home dose of synthroid. TSH is within normal range    Disposition:  -Anticipated Length of Stay in midnights and medical necessity (including a midnight in the Emergency Department after triage if applicable): >2      CHIEF COMPLAINT:  Abdominal pain, nausea and vomiting    HISTORY OF PRESENTING ILLNESS:  Patient is a 38 year old female with PMH significant for Graves disease, s/p total thyroidectomy with parathyroid autotransplant, sickle cell trait, obesity who presented to our ED for evaluationof 4 days of abdominal pain, nausea and vomiting.     Patient reports doing well until about 4 days ago when she started to have right sided flank pain. The pain was dull and continuous, worse with movement. The pain radiates towards her back. Denies any fever, but reports having chills. Endorses having  nausea and vomiting. Denies having any urinary symptoms.     PMH/PSH:  Patient Active Problem List   Diagnosis    Thyroid cancer (H)    TB lung, latent    Supervision of high risk pregnancy, unspecified, unspecified trimester    Sickle cell trait (H24)    Obesity (BMI 35.0-39.9 without comorbidity)    Hypocalcemia    Graves' ophthalmopathy    Graves disease    Pyelonephritis, acute    Sepsis (H)       Past Medical History:   Diagnosis Date    Graves disease         Past Surgical History:   Procedure Laterality Date     SECTION      THYROIDECTOMY             ALLERGIES:  No Known Allergies    MEDICATIONS:  Reviewed.  No current facility-administered medications for this encounter.     Current Outpatient Medications   Medication    azelaic acid (FINACIA) 15 % external gel    levothyroxine (SYNTHROID/LEVOTHROID) 112 MCG tablet    phentermine (ADIPEX-P) 37.5 MG capsule    spironolactone (ALDACTONE) 50 MG tablet    spironolactone (ALDACTONE) 50 MG tablet    tretinoin (RETIN-A) 0.025 % external cream    valACYclovir (VALTREX) 1000 mg tablet     No current facility-administered medications for this encounter.    Current Outpatient Medications:     azelaic acid (FINACIA) 15 % external gel, Apply topically every morning, Disp: , Rfl:     levothyroxine (SYNTHROID/LEVOTHROID) 112 MCG tablet, Take 112 mcg by mouth daily, Disp: , Rfl:     phentermine (ADIPEX-P) 37.5 MG capsule, Take 37.5 mg by mouth every morning, Disp: , Rfl:     spironolactone (ALDACTONE) 50 MG tablet, Take 50 mg by mouth every morning, Disp: , Rfl:     spironolactone (ALDACTONE) 50 MG tablet, Take 100 mg by mouth daily (with dinner), Disp: , Rfl:     tretinoin (RETIN-A) 0.025 % external cream, Apply topically at bedtime, Disp: , Rfl:     valACYclovir (VALTREX) 1000 mg tablet, Take 1,000 mg by mouth daily, Disp: , Rfl:    Scheduled Meds:  Continuous Infusions:  PRN Meds:.    SOCIAL HISTORY:  Social History     Socioeconomic History    Marital  "status: Single     Spouse name: Not on file    Number of children: Not on file    Years of education: Not on file    Highest education level: Not on file   Occupational History    Not on file   Tobacco Use    Smoking status: Never    Smokeless tobacco: Never   Substance and Sexual Activity    Alcohol use: No    Drug use: Not on file    Sexual activity: Not on file   Other Topics Concern    Not on file   Social History Narrative    Not on file     Social Determinants of Health     Financial Resource Strain: Not on file   Food Insecurity: Not on file   Transportation Needs: Not on file   Physical Activity: Not on file   Stress: Not on file   Social Connections: Not on file   Interpersonal Safety: Not on file   Housing Stability: Not on file       FAMILY HISTORY:  Reviewed and is not pertinent to this admission    ROS:  12 point review of systems reviewed and is negative except for what has already been mentioned in HPI.       PHYSICAL EXAM:  GENRL:  Not in acute distress   BP (!) 157/100   Pulse 97   Temp 98.9  F (37.2  C) (Oral)   Resp 22   Ht 1.626 m (5' 4\")   Wt 95.3 kg (210 lb)   LMP 11/14/2023   SpO2 100%   BMI 36.05 kg/m    No intake/output data recorded.  No intake/output data recorded.  HEENT: NC/AT      Eyes-  Pupil round and reactive to light bilaterally       Neck- supple, no JVP elevation,       Sclera- anicteric      Oropharynx- moist and pink  CHEST: Clear to auscultation bilateral anteriorly, no ronchi or wheezing  HEART: S1S2 normal, regular.   ABDMN: Soft. Non-distended.  Right CVA tenderness noted. No guarding or rigidity. Bowel sounds- active  EXTRM: No pedal edema   INTGM: No skin rash, no cyanosis or clubbing  NEURO: Alert and awake. Cranial nerves II-XII grossly intact. No focal neurological deficit.        DIAGNOSTIC DATA:    Recent Labs   Lab 11/21/23  0936   WBC 23.8*   HGB 13.5   HCT 38.1          Recent Labs   Lab 11/21/23  0936   *   CO2 26   BUN 9.5       No results " "for input(s): \"INR\" in the last 168 hours.    Recent Labs   Lab 11/21/23  0936   *   CO2 26   BUN 9.5   ALBUMIN 4.2   ALKPHOS 184*   ALT 30   AST 37         CT Abdomen Pelvis w Contrast    Result Date: 11/21/2023  EXAM: CT ABDOMEN PELVIS W CONTRAST LOCATION: [facility] DATE: 11/21/2023 INDICATION: diffuse abdominal pain, N V COMPARISON: CT without contrast 08/26/2023 TECHNIQUE: CT scan of the abdomen and pelvis was performed following injection of IV contrast. Multiplanar reformats were obtained. Dose reduction techniques were used. CONTRAST: [id number] 90ml FINDINGS: LOWER CHEST: Normal. HEPATOBILIARY: Gallbladder removed. Normal liver. PANCREAS: Normal. SPLEEN: Normal. ADRENAL GLANDS: Normal. KIDNEYS/BLADDER: Severe pyelonephritis right kidney. No abscess. No hydronephrosis. No renal stones. BOWEL: Normal. LYMPH NODES: Normal. VASCULATURE: Unremarkable. PELVIC ORGANS: Normal. MUSCULOSKELETAL: No significant findings. Tiny umbilical hernia containing fat only.     IMPRESSION: 1.  Severe right pyelonephritis without abscess. 2.  No renal stones or obstruction.      Patient's new lab studies reviewed personally.  Patient's new radiology reports reviewed personally.  I personally viewed and personally interpreted patient's EKG:     Note created using dragon voice recognition software.  Errors in spelling or words which seems out of context are unintentional.  Sounds alike errors may have escaped editing.     11/21/2023  Se Matias MD  HOSPITALIST, [facility]  PAGER NO. [phone]    "

## 2023-11-22 LAB
ACINETOBACTER SPECIES: NOT DETECTED
ALBUMIN SERPL BCG-MCNC: 3.2 G/DL (ref 3.5–5.2)
ALP SERPL-CCNC: 136 U/L (ref 40–150)
ALT SERPL W P-5'-P-CCNC: 25 U/L (ref 0–50)
ANION GAP SERPL CALCULATED.3IONS-SCNC: 11 MMOL/L (ref 7–15)
AST SERPL W P-5'-P-CCNC: 25 U/L (ref 0–45)
BACTERIA UR CULT: NORMAL
BILIRUB SERPL-MCNC: 0.4 MG/DL
BUN SERPL-MCNC: 5.7 MG/DL (ref 6–20)
CALCIUM SERPL-MCNC: 7.4 MG/DL (ref 8.6–10)
CHLORIDE SERPL-SCNC: 103 MMOL/L (ref 98–107)
CITROBACTER SPECIES: NOT DETECTED
CREAT SERPL-MCNC: 0.96 MG/DL (ref 0.51–0.95)
CTX-M: NOT DETECTED
DEPRECATED HCO3 PLAS-SCNC: 26 MMOL/L (ref 22–29)
EGFRCR SERPLBLD CKD-EPI 2021: 76 ML/MIN/1.73M2
ENTEROBACTER SPECIES: NOT DETECTED
ERYTHROCYTE [DISTWIDTH] IN BLOOD BY AUTOMATED COUNT: 12.5 % (ref 10–15)
ESCHERICHIA COLI: DETECTED
GLUCOSE SERPL-MCNC: 104 MG/DL (ref 70–99)
HCT VFR BLD AUTO: 30.4 % (ref 35–47)
HGB BLD-MCNC: 10.4 G/DL (ref 11.7–15.7)
IMP: NOT DETECTED
KLEBSIELLA OXYTOCA: NOT DETECTED
KLEBSIELLA PNEUMONIAE: NOT DETECTED
KPC: NOT DETECTED
MCH RBC QN AUTO: 29.1 PG (ref 26.5–33)
MCHC RBC AUTO-ENTMCNC: 34.2 G/DL (ref 31.5–36.5)
MCV RBC AUTO: 85 FL (ref 78–100)
NDM: NOT DETECTED
OXA (DETECTED/NOT DETECTED): NOT DETECTED
PLATELET # BLD AUTO: 314 10E3/UL (ref 150–450)
POTASSIUM SERPL-SCNC: 3.2 MMOL/L (ref 3.4–5.3)
POTASSIUM SERPL-SCNC: 3.3 MMOL/L (ref 3.4–5.3)
PROT SERPL-MCNC: 6.6 G/DL (ref 6.4–8.3)
PROTEUS SPECIES: NOT DETECTED
PSEUDOMONAS AERUGINOSA: NOT DETECTED
RBC # BLD AUTO: 3.58 10E6/UL (ref 3.8–5.2)
SODIUM SERPL-SCNC: 140 MMOL/L (ref 135–145)
VIM: NOT DETECTED
WBC # BLD AUTO: 14.5 10E3/UL (ref 4–11)

## 2023-11-22 PROCEDURE — 99232 SBSQ HOSP IP/OBS MODERATE 35: CPT | Performed by: STUDENT IN AN ORGANIZED HEALTH CARE EDUCATION/TRAINING PROGRAM

## 2023-11-22 PROCEDURE — 258N000003 HC RX IP 258 OP 636: Performed by: INTERNAL MEDICINE

## 2023-11-22 PROCEDURE — 84132 ASSAY OF SERUM POTASSIUM: CPT | Performed by: STUDENT IN AN ORGANIZED HEALTH CARE EDUCATION/TRAINING PROGRAM

## 2023-11-22 PROCEDURE — 258N000003 HC RX IP 258 OP 636: Performed by: STUDENT IN AN ORGANIZED HEALTH CARE EDUCATION/TRAINING PROGRAM

## 2023-11-22 PROCEDURE — 80053 COMPREHEN METABOLIC PANEL: CPT | Performed by: INTERNAL MEDICINE

## 2023-11-22 PROCEDURE — 85027 COMPLETE CBC AUTOMATED: CPT | Performed by: INTERNAL MEDICINE

## 2023-11-22 PROCEDURE — 250N000013 HC RX MED GY IP 250 OP 250 PS 637: Performed by: HOSPITALIST

## 2023-11-22 PROCEDURE — 36415 COLL VENOUS BLD VENIPUNCTURE: CPT | Performed by: INTERNAL MEDICINE

## 2023-11-22 PROCEDURE — 250N000011 HC RX IP 250 OP 636: Mod: JZ | Performed by: INTERNAL MEDICINE

## 2023-11-22 PROCEDURE — 999N000127 HC STATISTIC PERIPHERAL IV START W US GUIDANCE

## 2023-11-22 PROCEDURE — 258N000003 HC RX IP 258 OP 636: Performed by: HOSPITALIST

## 2023-11-22 PROCEDURE — 120N000001 HC R&B MED SURG/OB

## 2023-11-22 PROCEDURE — 36415 COLL VENOUS BLD VENIPUNCTURE: CPT | Performed by: STUDENT IN AN ORGANIZED HEALTH CARE EDUCATION/TRAINING PROGRAM

## 2023-11-22 PROCEDURE — 250N000013 HC RX MED GY IP 250 OP 250 PS 637: Performed by: STUDENT IN AN ORGANIZED HEALTH CARE EDUCATION/TRAINING PROGRAM

## 2023-11-22 PROCEDURE — 250N000013 HC RX MED GY IP 250 OP 250 PS 637: Performed by: INTERNAL MEDICINE

## 2023-11-22 RX ORDER — SODIUM CHLORIDE 9 MG/ML
INJECTION, SOLUTION INTRAVENOUS CONTINUOUS
Status: ACTIVE | OUTPATIENT
Start: 2023-11-22 | End: 2023-11-23

## 2023-11-22 RX ORDER — NALOXONE HYDROCHLORIDE 0.4 MG/ML
0.4 INJECTION, SOLUTION INTRAMUSCULAR; INTRAVENOUS; SUBCUTANEOUS
Status: DISCONTINUED | OUTPATIENT
Start: 2023-11-22 | End: 2023-11-27 | Stop reason: HOSPADM

## 2023-11-22 RX ORDER — NALOXONE HYDROCHLORIDE 0.4 MG/ML
0.2 INJECTION, SOLUTION INTRAMUSCULAR; INTRAVENOUS; SUBCUTANEOUS
Status: DISCONTINUED | OUTPATIENT
Start: 2023-11-22 | End: 2023-11-27 | Stop reason: HOSPADM

## 2023-11-22 RX ORDER — ACETAMINOPHEN 325 MG/1
975 TABLET ORAL EVERY 8 HOURS
Status: DISCONTINUED | OUTPATIENT
Start: 2023-11-22 | End: 2023-11-27 | Stop reason: HOSPADM

## 2023-11-22 RX ORDER — OXYCODONE HYDROCHLORIDE 5 MG/1
5 TABLET ORAL EVERY 4 HOURS PRN
Status: DISCONTINUED | OUTPATIENT
Start: 2023-11-22 | End: 2023-11-27 | Stop reason: HOSPADM

## 2023-11-22 RX ORDER — POTASSIUM CHLORIDE 1500 MG/1
40 TABLET, EXTENDED RELEASE ORAL ONCE
Status: COMPLETED | OUTPATIENT
Start: 2023-11-22 | End: 2023-11-22

## 2023-11-22 RX ORDER — LOPERAMIDE HCL 2 MG
2 CAPSULE ORAL 4 TIMES DAILY PRN
Status: DISCONTINUED | OUTPATIENT
Start: 2023-11-22 | End: 2023-11-27 | Stop reason: HOSPADM

## 2023-11-22 RX ORDER — POTASSIUM CHLORIDE 1500 MG/1
40 TABLET, EXTENDED RELEASE ORAL ONCE
Qty: 2 TABLET | Refills: 0 | Status: COMPLETED | OUTPATIENT
Start: 2023-11-22 | End: 2023-11-22

## 2023-11-22 RX ADMIN — SPIRONOLACTONE 100 MG: 100 TABLET ORAL at 18:04

## 2023-11-22 RX ADMIN — VALACYCLOVIR HYDROCHLORIDE 1000 MG: 500 TABLET, FILM COATED ORAL at 08:09

## 2023-11-22 RX ADMIN — LEVOTHYROXINE SODIUM 112 MCG: 0.11 TABLET ORAL at 08:07

## 2023-11-22 RX ADMIN — ACETAMINOPHEN 975 MG: 325 TABLET, FILM COATED ORAL at 12:19

## 2023-11-22 RX ADMIN — OXYCODONE HYDROCHLORIDE 5 MG: 5 TABLET ORAL at 17:50

## 2023-11-22 RX ADMIN — SODIUM CHLORIDE 1000 ML: 9 INJECTION, SOLUTION INTRAVENOUS at 05:25

## 2023-11-22 RX ADMIN — ACETAMINOPHEN 975 MG: 325 TABLET, FILM COATED ORAL at 19:26

## 2023-11-22 RX ADMIN — PIPERACILLIN AND TAZOBACTAM 3.38 G: 3; .375 INJECTION, POWDER, LYOPHILIZED, FOR SOLUTION INTRAVENOUS at 11:30

## 2023-11-22 RX ADMIN — HYDROMORPHONE HYDROCHLORIDE 0.5 MG: 1 INJECTION, SOLUTION INTRAMUSCULAR; INTRAVENOUS; SUBCUTANEOUS at 00:28

## 2023-11-22 RX ADMIN — LOPERAMIDE HYDROCHLORIDE 2 MG: 2 CAPSULE ORAL at 01:04

## 2023-11-22 RX ADMIN — ONDANSETRON 4 MG: 2 INJECTION INTRAMUSCULAR; INTRAVENOUS at 17:58

## 2023-11-22 RX ADMIN — ONDANSETRON 4 MG: 2 INJECTION INTRAMUSCULAR; INTRAVENOUS at 00:40

## 2023-11-22 RX ADMIN — SODIUM CHLORIDE: 9 INJECTION, SOLUTION INTRAVENOUS at 07:58

## 2023-11-22 RX ADMIN — OXYCODONE HYDROCHLORIDE 5 MG: 5 TABLET ORAL at 22:39

## 2023-11-22 RX ADMIN — HYDROMORPHONE HYDROCHLORIDE 0.5 MG: 1 INJECTION, SOLUTION INTRAMUSCULAR; INTRAVENOUS; SUBCUTANEOUS at 19:18

## 2023-11-22 RX ADMIN — HYDROMORPHONE HYDROCHLORIDE 0.5 MG: 1 INJECTION, SOLUTION INTRAMUSCULAR; INTRAVENOUS; SUBCUTANEOUS at 07:51

## 2023-11-22 RX ADMIN — ONDANSETRON 4 MG: 2 INJECTION INTRAMUSCULAR; INTRAVENOUS at 07:52

## 2023-11-22 RX ADMIN — LOPERAMIDE HYDROCHLORIDE 2 MG: 2 CAPSULE ORAL at 11:51

## 2023-11-22 RX ADMIN — HYDROMORPHONE HYDROCHLORIDE 0.5 MG: 1 INJECTION, SOLUTION INTRAMUSCULAR; INTRAVENOUS; SUBCUTANEOUS at 11:23

## 2023-11-22 RX ADMIN — SPIRONOLACTONE 50 MG: 50 TABLET ORAL at 08:07

## 2023-11-22 RX ADMIN — POTASSIUM CHLORIDE 40 MEQ: 1500 TABLET, EXTENDED RELEASE ORAL at 22:33

## 2023-11-22 RX ADMIN — SODIUM CHLORIDE: 9 INJECTION, SOLUTION INTRAVENOUS at 14:29

## 2023-11-22 RX ADMIN — SODIUM CHLORIDE: 9 INJECTION, SOLUTION INTRAVENOUS at 19:26

## 2023-11-22 RX ADMIN — POTASSIUM CHLORIDE 40 MEQ: 1500 TABLET, EXTENDED RELEASE ORAL at 13:06

## 2023-11-22 RX ADMIN — ACETAMINOPHEN 650 MG: 325 TABLET ORAL at 03:02

## 2023-11-22 RX ADMIN — LOPERAMIDE HYDROCHLORIDE 2 MG: 2 CAPSULE ORAL at 22:45

## 2023-11-22 RX ADMIN — OXYCODONE HYDROCHLORIDE 5 MG: 5 TABLET ORAL at 12:19

## 2023-11-22 RX ADMIN — PIPERACILLIN AND TAZOBACTAM 3.38 G: 3; .375 INJECTION, POWDER, LYOPHILIZED, FOR SOLUTION INTRAVENOUS at 18:04

## 2023-11-22 RX ADMIN — LOPERAMIDE HYDROCHLORIDE 2 MG: 2 CAPSULE ORAL at 07:56

## 2023-11-22 RX ADMIN — LOPERAMIDE HYDROCHLORIDE 2 MG: 2 CAPSULE ORAL at 18:14

## 2023-11-22 RX ADMIN — PIPERACILLIN AND TAZOBACTAM 3.38 G: 3; .375 INJECTION, POWDER, LYOPHILIZED, FOR SOLUTION INTRAVENOUS at 01:04

## 2023-11-22 ASSESSMENT — ACTIVITIES OF DAILY LIVING (ADL)
ADLS_ACUITY_SCORE: 20

## 2023-11-22 NOTE — PLAN OF CARE
"  Problem: Adult Inpatient Plan of Care  Goal: Plan of Care Review  Description: The Plan of Care Review/Shift note should be completed every shift.  The Outcome Evaluation is a brief statement about your assessment that the patient is improving, declining, or no change.  This information will be displayed automatically on your shift  note.  Outcome: Progressing  Goal: Patient-Specific Goal (Individualized)  Description: You can add care plan individualizations to a care plan. Examples of Individualization might be:  \"Parent requests to be called daily at 9am for status\", \"I have a hard time hearing out of my right ear\", or \"Do not touch me to wake me up as it startles  me\".  Outcome: Progressing  Goal: Absence of Hospital-Acquired Illness or Injury  Outcome: Progressing  Intervention: Identify and Manage Fall Risk  Recent Flowsheet Documentation  Taken 11/22/2023 0822 by Anika Felix RN  Safety Promotion/Fall Prevention:   clutter free environment maintained   safety round/check completed  Intervention: Prevent Skin Injury  Recent Flowsheet Documentation  Taken 11/22/2023 1311 by Anika Felix RN  Body Position: position changed independently  Taken 11/22/2023 0822 by Anika Felix RN  Body Position: position changed independently  Goal: Optimal Comfort and Wellbeing  Outcome: Progressing  Goal: Readiness for Transition of Care  Outcome: Progressing     Problem: Infection  Goal: Absence of Infection Signs and Symptoms  Outcome: Progressing   Goal Outcome Evaluation:       Pt states she is starting to feel better as the day progressed.  Pt's pain is better managed with the addition of the oral meds, alternating with the IV.  Pt has had frequent loose/watery stools today, has requested prn IV dilaudid x2.  Pt has mildly elevated temp, but no longer experiencing chills.  Pt has also requested IV prn medication for nausea, pt has not had an appetite, drinking clear liquids, did ask for applesauce to try.  Pt " is on K+ protocol, K+ was 3.3, po replacement given with a re-check at 17:00.  VSS.  IVF are at 75 ml/hr.

## 2023-11-22 NOTE — SIGNIFICANT EVENT
Significant Event Note    Time of event: 1:01 AM November 22, 2023    Description of event:  RN reported positive blood culture 1 of 2 bottles from 11/21/2023 at 10:24 AM growing gram-negative bacilli.  Blood culture from same day at 10:51 AM is in process.    Plan:  Follow-up Verigene  Continue IV Zosyn    Discussed with: bedside nurse    Tremaine Velarde MD

## 2023-11-22 NOTE — PROGRESS NOTES
"[facility]    Medicine Progress Note - Hospitalist Service    Date of Admission:  11/21/2023    Assessment & Plan   Lanie Manuel Is a 39 yo female with PMH of Graves' disease status post total thyroidectomy with parathyroid autotransplant, sickle cell trait, obesity who presented with abdominal pain nausea and vomiting for 4 days.    Sepsis  Acute right pyelonephritis  --Fever, abdominal pain, nausea and vomiting  -- Urine culture pending  -- Blood culture growing E. coli sensitivity pending  -- Continue IV Zosyn, IV fluids  -- Analgesics    Lactic acidosis-resolved  Leukocytosis-improving    Hypokalemia  -- likely 2/2 GI loss.   -- Replete per protocol    Anemia  Sickle cell trait  -- no e/o bleeding  -- on IVF- dilution may play a role  -- monitor cbc    H/o grave's disease  -- s/p total thyroidectomy with parathyroid autotransplant  -- c/w PTA synthroid. TSH wnl    Obesity  -- BMI:36.05 kg/m2        Diet: Combination Diet Regular Diet Adult    DVT Prophylaxis: Pneumatic Compression Devices  Tabares Catheter: Not present  Lines: None     Cardiac Monitoring: None  Code Status: Full Code      Clinically Significant Risk Factors        # Hypokalemia: Lowest K = 3.3 mmol/L in last 2 days, will replace as needed   # Hypocalcemia: Lowest Ca = 7.4 mg/dL in last 2 days, will monitor and replace as appropriate  # Hypercalcemia: corrected calcium is >10.1, will monitor as appropriate    # Hypoalbuminemia: Lowest albumin = 3.2 g/dL at 11/22/2023  8:40 AM, will monitor as appropriate            # Obesity: Estimated body mass index is 36.05 kg/m  as calculated from the following:    Height as of this encounter: 1.626 m (5' 4\").    Weight as of this encounter: 95.3 kg (210 lb)., PRESENT ON ADMISSION            Disposition Plan     Expected Discharge Date: 11/22/2023                    Courtney Muñoz MD  Hospitalist Service  [facility]  Securely message with [employer] (more " info)  Text page via [facility] Paging/Directory   ______________________________________________________________________    Interval History   Patient is new to me today.  Patient is seen and examined at bedside.  Pt has abdominal discomfort, generalized weakness, diarrhea. Tmax: 102.9    Physical Exam   Vital Signs: Temp: 99.5  F (37.5  C) Temp src: Oral BP: 104/62 Pulse: 105   Resp: 18 SpO2: 95 % O2 Device: None (Room air)    Weight: 210 lbs 0 oz    GEN: Alert and oriented. Not in acute distress.  HEENT: Atraumatic, mucous membrane- moist and pink.  Chest: Bilateral air entry.  CVS: S1S2 regular.   Abdomen: Soft. Right abd. Tenderness. No organomegaly. No guarding or rigidity. Bowel sounds active.   Extremities: No pedal edema.  CNS: No involuntary movements.  Skin: no cyanosis or clubbing.     Medical Decision Making             Data

## 2023-11-22 NOTE — PLAN OF CARE
Goal Outcome Evaluation:       Pt is alert and able to communicate needs. PRN dilaudid, tylenol and zofran given for nausea and pain. Pt stated she is having diarrhea, darrion LOPEZ. Imodium given. IVF infusing at 125ml.        2012- sepsis protocol triggered, vitals and lactic lab ordered. Result came back 1.1.  00:56 - Blood culture came back growing germ-negative bacilli. Notified Dr. Velarde.   04:00 am- pt had a temp of 102.9, notified . one time dose of bolus received. Temp recheck 99.6.

## 2023-11-22 NOTE — PLAN OF CARE
Problem: Adult Inpatient Plan of Care  Goal: Plan of Care Review  Description: The Plan of Care Review/Shift note should be completed every shift.  The Outcome Evaluation is a brief statement about your assessment that the patient is improving, declining, or no change.  This information will be displayed automatically on your shift  note.  Outcome: Progressing   Goal Outcome Evaluation:       IV dilaudid given x1 for pain management. Pt finds this effective. IV fluids infusing. IV zosyn continued. Urine culture pending. VSS.                   Care Management Follow Up    Length of Stay (days): 1    Expected Discharge Date: 05/09/2023     Concerns to be Addressed:       Patient plan of care discussed at interdisciplinary rounds: Yes    Anticipated Discharge Disposition:       Anticipated Discharge Services:    Anticipated Discharge DME:      Patient/family educated on Medicare website which has current facility and service quality ratings:    Education Provided on the Discharge Plan:    Patient/Family in Agreement with the Plan:      Referrals Placed by CM/SW:    Private pay costs discussed: Not applicable    Additional Information:  GREGG called [facility] [place] to discuss patient return.  Spoke to Josefina, Director of nursing, [phone]   who was not comfortable with Bhavana being discharged back to their facility prior to her Psych needs being met.   2021 accidentally was given the incorrect medication which caused a dementia diagnosis to be put into patients chart which is incorrect.   Josefina believes that patient has had some sort of psychotic break and symptoms are stemming from a Psychiatric issue not a dementia issue. Patient continues to not want to eat and to talk about wanting to die. They bring patient to hospital but then these issues return soon after she comes  back to facility and they want to stop sending her to hospital.   Josefina requested too come and meet with patient and also speak to the doctor prior to discharge.     GREGG paged Doctor who is agreeable to care conference and called back facility to arrange a time.     Addendum - 1630 DON coming at 9 AM    FRANKIE Eastman

## 2023-11-23 LAB
ANION GAP SERPL CALCULATED.3IONS-SCNC: 9 MMOL/L (ref 7–15)
BASOPHILS # BLD AUTO: ABNORMAL 10*3/UL
BASOPHILS # BLD MANUAL: 0 10E3/UL (ref 0–0.2)
BASOPHILS NFR BLD AUTO: ABNORMAL %
BASOPHILS NFR BLD MANUAL: 0 %
BUN SERPL-MCNC: 4.5 MG/DL (ref 6–20)
CALCIUM SERPL-MCNC: 7.4 MG/DL (ref 8.6–10)
CHLORIDE SERPL-SCNC: 105 MMOL/L (ref 98–107)
CREAT SERPL-MCNC: 1.01 MG/DL (ref 0.51–0.95)
DEPRECATED HCO3 PLAS-SCNC: 24 MMOL/L (ref 22–29)
EGFRCR SERPLBLD CKD-EPI 2021: 72 ML/MIN/1.73M2
EOSINOPHIL # BLD AUTO: ABNORMAL 10*3/UL
EOSINOPHIL # BLD MANUAL: 0 10E3/UL (ref 0–0.7)
EOSINOPHIL NFR BLD AUTO: ABNORMAL %
EOSINOPHIL NFR BLD MANUAL: 0 %
ERYTHROCYTE [DISTWIDTH] IN BLOOD BY AUTOMATED COUNT: 13 % (ref 10–15)
GLUCOSE SERPL-MCNC: 105 MG/DL (ref 70–99)
HCT VFR BLD AUTO: 28.2 % (ref 35–47)
HGB BLD-MCNC: 9.5 G/DL (ref 11.7–15.7)
IMM GRANULOCYTES # BLD: ABNORMAL 10*3/UL
IMM GRANULOCYTES NFR BLD: ABNORMAL %
LYMPHOCYTES # BLD AUTO: ABNORMAL 10*3/UL
LYMPHOCYTES # BLD MANUAL: 1.8 10E3/UL (ref 0.8–5.3)
LYMPHOCYTES NFR BLD AUTO: ABNORMAL %
LYMPHOCYTES NFR BLD MANUAL: 20 %
MAGNESIUM SERPL-MCNC: 1.7 MG/DL (ref 1.7–2.3)
MCH RBC QN AUTO: 28.4 PG (ref 26.5–33)
MCHC RBC AUTO-ENTMCNC: 33.7 G/DL (ref 31.5–36.5)
MCV RBC AUTO: 84 FL (ref 78–100)
MONOCYTES # BLD AUTO: ABNORMAL 10*3/UL
MONOCYTES # BLD MANUAL: 1.3 10E3/UL (ref 0–1.3)
MONOCYTES NFR BLD AUTO: ABNORMAL %
MONOCYTES NFR BLD MANUAL: 14 %
NEUTROPHILS # BLD AUTO: ABNORMAL 10*3/UL
NEUTROPHILS # BLD MANUAL: 6 10E3/UL (ref 1.6–8.3)
NEUTROPHILS NFR BLD AUTO: ABNORMAL %
NEUTROPHILS NFR BLD MANUAL: 66 %
NRBC # BLD AUTO: 0 10E3/UL
NRBC BLD AUTO-RTO: 0 /100
PLAT MORPH BLD: NORMAL
PLATELET # BLD AUTO: 322 10E3/UL (ref 150–450)
POTASSIUM SERPL-SCNC: 3.3 MMOL/L (ref 3.4–5.3)
POTASSIUM SERPL-SCNC: 3.3 MMOL/L (ref 3.4–5.3)
POTASSIUM SERPL-SCNC: 3.9 MMOL/L (ref 3.4–5.3)
RBC # BLD AUTO: 3.34 10E6/UL (ref 3.8–5.2)
RBC MORPH BLD: NORMAL
SODIUM SERPL-SCNC: 138 MMOL/L (ref 135–145)
WBC # BLD AUTO: 9.1 10E3/UL (ref 4–11)

## 2023-11-23 PROCEDURE — 84132 ASSAY OF SERUM POTASSIUM: CPT | Performed by: STUDENT IN AN ORGANIZED HEALTH CARE EDUCATION/TRAINING PROGRAM

## 2023-11-23 PROCEDURE — 250N000013 HC RX MED GY IP 250 OP 250 PS 637: Performed by: INTERNAL MEDICINE

## 2023-11-23 PROCEDURE — 36415 COLL VENOUS BLD VENIPUNCTURE: CPT | Performed by: STUDENT IN AN ORGANIZED HEALTH CARE EDUCATION/TRAINING PROGRAM

## 2023-11-23 PROCEDURE — 80048 BASIC METABOLIC PNL TOTAL CA: CPT | Performed by: STUDENT IN AN ORGANIZED HEALTH CARE EDUCATION/TRAINING PROGRAM

## 2023-11-23 PROCEDURE — 85027 COMPLETE CBC AUTOMATED: CPT | Performed by: STUDENT IN AN ORGANIZED HEALTH CARE EDUCATION/TRAINING PROGRAM

## 2023-11-23 PROCEDURE — 85007 BL SMEAR W/DIFF WBC COUNT: CPT | Performed by: STUDENT IN AN ORGANIZED HEALTH CARE EDUCATION/TRAINING PROGRAM

## 2023-11-23 PROCEDURE — 250N000013 HC RX MED GY IP 250 OP 250 PS 637: Performed by: STUDENT IN AN ORGANIZED HEALTH CARE EDUCATION/TRAINING PROGRAM

## 2023-11-23 PROCEDURE — 120N000001 HC R&B MED SURG/OB

## 2023-11-23 PROCEDURE — 250N000011 HC RX IP 250 OP 636: Performed by: INTERNAL MEDICINE

## 2023-11-23 PROCEDURE — 250N000011 HC RX IP 250 OP 636: Performed by: STUDENT IN AN ORGANIZED HEALTH CARE EDUCATION/TRAINING PROGRAM

## 2023-11-23 PROCEDURE — 258N000003 HC RX IP 258 OP 636: Performed by: STUDENT IN AN ORGANIZED HEALTH CARE EDUCATION/TRAINING PROGRAM

## 2023-11-23 PROCEDURE — 87507 IADNA-DNA/RNA PROBE TQ 12-25: CPT | Performed by: STUDENT IN AN ORGANIZED HEALTH CARE EDUCATION/TRAINING PROGRAM

## 2023-11-23 PROCEDURE — 83735 ASSAY OF MAGNESIUM: CPT | Performed by: STUDENT IN AN ORGANIZED HEALTH CARE EDUCATION/TRAINING PROGRAM

## 2023-11-23 PROCEDURE — 250N000013 HC RX MED GY IP 250 OP 250 PS 637: Performed by: HOSPITALIST

## 2023-11-23 PROCEDURE — 99232 SBSQ HOSP IP/OBS MODERATE 35: CPT | Performed by: STUDENT IN AN ORGANIZED HEALTH CARE EDUCATION/TRAINING PROGRAM

## 2023-11-23 RX ORDER — CEFTRIAXONE 2 G/1
2 INJECTION, POWDER, FOR SOLUTION INTRAMUSCULAR; INTRAVENOUS EVERY 24 HOURS
Status: COMPLETED | OUTPATIENT
Start: 2023-11-23 | End: 2023-11-26

## 2023-11-23 RX ORDER — PROCHLORPERAZINE 25 MG
25 SUPPOSITORY, RECTAL RECTAL EVERY 12 HOURS PRN
Status: DISCONTINUED | OUTPATIENT
Start: 2023-11-23 | End: 2023-11-27 | Stop reason: HOSPADM

## 2023-11-23 RX ORDER — POTASSIUM CHLORIDE 1500 MG/1
40 TABLET, EXTENDED RELEASE ORAL ONCE
Status: COMPLETED | OUTPATIENT
Start: 2023-11-23 | End: 2023-11-23

## 2023-11-23 RX ORDER — PROCHLORPERAZINE MALEATE 10 MG
10 TABLET ORAL EVERY 6 HOURS PRN
Status: DISCONTINUED | OUTPATIENT
Start: 2023-11-23 | End: 2023-11-27 | Stop reason: HOSPADM

## 2023-11-23 RX ORDER — ONDANSETRON 2 MG/ML
4 INJECTION INTRAMUSCULAR; INTRAVENOUS EVERY 6 HOURS PRN
Status: DISCONTINUED | OUTPATIENT
Start: 2023-11-23 | End: 2023-11-27 | Stop reason: HOSPADM

## 2023-11-23 RX ORDER — ONDANSETRON 4 MG/1
4 TABLET, ORALLY DISINTEGRATING ORAL EVERY 6 HOURS PRN
Status: DISCONTINUED | OUTPATIENT
Start: 2023-11-23 | End: 2023-11-27 | Stop reason: HOSPADM

## 2023-11-23 RX ORDER — SODIUM CHLORIDE 9 MG/ML
INJECTION, SOLUTION INTRAVENOUS CONTINUOUS
Status: ACTIVE | OUTPATIENT
Start: 2023-11-23 | End: 2023-11-24

## 2023-11-23 RX ADMIN — LOPERAMIDE HYDROCHLORIDE 2 MG: 2 CAPSULE ORAL at 22:41

## 2023-11-23 RX ADMIN — SODIUM CHLORIDE: 9 INJECTION, SOLUTION INTRAVENOUS at 22:24

## 2023-11-23 RX ADMIN — CEFTRIAXONE SODIUM 2 G: 2 INJECTION, POWDER, FOR SOLUTION INTRAMUSCULAR; INTRAVENOUS at 12:07

## 2023-11-23 RX ADMIN — ONDANSETRON 4 MG: 4 TABLET, ORALLY DISINTEGRATING ORAL at 15:19

## 2023-11-23 RX ADMIN — ONDANSETRON 4 MG: 2 INJECTION INTRAMUSCULAR; INTRAVENOUS at 06:55

## 2023-11-23 RX ADMIN — ACETAMINOPHEN 975 MG: 325 TABLET, FILM COATED ORAL at 20:17

## 2023-11-23 RX ADMIN — SPIRONOLACTONE 50 MG: 50 TABLET ORAL at 09:42

## 2023-11-23 RX ADMIN — PIPERACILLIN AND TAZOBACTAM 3.38 G: 3; .375 INJECTION, POWDER, LYOPHILIZED, FOR SOLUTION INTRAVENOUS at 00:24

## 2023-11-23 RX ADMIN — SPIRONOLACTONE 100 MG: 100 TABLET ORAL at 17:42

## 2023-11-23 RX ADMIN — LEVOTHYROXINE SODIUM 112 MCG: 0.11 TABLET ORAL at 09:42

## 2023-11-23 RX ADMIN — VALACYCLOVIR HYDROCHLORIDE 1000 MG: 500 TABLET, FILM COATED ORAL at 12:06

## 2023-11-23 RX ADMIN — SODIUM CHLORIDE: 9 INJECTION, SOLUTION INTRAVENOUS at 16:29

## 2023-11-23 RX ADMIN — SODIUM CHLORIDE: 9 INJECTION, SOLUTION INTRAVENOUS at 00:24

## 2023-11-23 RX ADMIN — LOPERAMIDE HYDROCHLORIDE 2 MG: 2 CAPSULE ORAL at 15:19

## 2023-11-23 RX ADMIN — OXYCODONE HYDROCHLORIDE 5 MG: 5 TABLET ORAL at 22:41

## 2023-11-23 RX ADMIN — POLYETHYLENE GLYCOL 3350 17 G: 17 POWDER, FOR SOLUTION ORAL at 09:41

## 2023-11-23 RX ADMIN — POTASSIUM CHLORIDE 40 MEQ: 1500 TABLET, EXTENDED RELEASE ORAL at 06:46

## 2023-11-23 RX ADMIN — ACETAMINOPHEN 975 MG: 325 TABLET, FILM COATED ORAL at 12:05

## 2023-11-23 RX ADMIN — PIPERACILLIN AND TAZOBACTAM 3.38 G: 3; .375 INJECTION, POWDER, LYOPHILIZED, FOR SOLUTION INTRAVENOUS at 09:42

## 2023-11-23 RX ADMIN — ACETAMINOPHEN 975 MG: 325 TABLET, FILM COATED ORAL at 05:07

## 2023-11-23 RX ADMIN — PROCHLORPERAZINE EDISYLATE 10 MG: 5 INJECTION INTRAMUSCULAR; INTRAVENOUS at 17:00

## 2023-11-23 RX ADMIN — LOPERAMIDE HYDROCHLORIDE 2 MG: 2 CAPSULE ORAL at 05:07

## 2023-11-23 RX ADMIN — HYDROMORPHONE HYDROCHLORIDE 0.5 MG: 1 INJECTION, SOLUTION INTRAMUSCULAR; INTRAVENOUS; SUBCUTANEOUS at 06:55

## 2023-11-23 RX ADMIN — OXYCODONE HYDROCHLORIDE 5 MG: 5 TABLET ORAL at 17:12

## 2023-11-23 ASSESSMENT — ACTIVITIES OF DAILY LIVING (ADL)
ADLS_ACUITY_SCORE: 20
ADLS_ACUITY_SCORE: 20
ADLS_ACUITY_SCORE: 24
ADLS_ACUITY_SCORE: 20
ADLS_ACUITY_SCORE: 24
ADLS_ACUITY_SCORE: 20
ADLS_ACUITY_SCORE: 24
ADLS_ACUITY_SCORE: 20

## 2023-11-23 NOTE — PLAN OF CARE
Problem: Infection  Goal: Absence of Infection Signs and Symptoms  Outcome: Not Progressing     Problem: Adult Inpatient Plan of Care  Goal: Optimal Comfort and Wellbeing  Outcome: Progressing  Intervention: Monitor Pain and Promote Comfort  Recent Flowsheet Documentation  Taken 11/22/2023 1918 by Silvia Hamilton, RN  Pain Management Interventions: medication (see MAR)   Goal Outcome Evaluation:       ED admit, arrived on the unit via wheelchair, was oriented to room, call light, bed and TV controls, was in severe pain on arrival, had dilaudid prn, was helpful,   temp 100.7, had scheduled tylenol, was rechecked for 100.1, other vitals stable, triggered sepsis protocol, writer was informed  by ED RN that per MD, we should not run the protocol when it triggers because patient is already on antibiotics, alert and oriented.

## 2023-11-23 NOTE — PROGRESS NOTES
"[facility]    Medicine Progress Note - Hospitalist Service    Date of Admission:  11/21/2023    Assessment & Plan   Lanie Manuel Is a 37 yo female with PMH of Graves' disease status post total thyroidectomy with parathyroid autotransplant, sickle cell trait, obesity who presented with abdominal pain nausea and vomiting for 4 days.    Sepsis  Acute right pyelonephritis  --Fever, abdominal pain, nausea and vomiting  -- Urine culture pending  -- Blood culture growing E. coli   -- S/p IV Zosyn. Currently on ceftriaxone per sensitivity   -- IV fluids  -- Analgesics    Lactic acidosis-resolved  Leukocytosis-improving    Hypokalemia  -- likely 2/2 GI loss.   -- Replete per protocol    Anemia  Sickle cell trait  -- no e/o bleeding  -- on IVF- dilution may play a role  -- monitor cbc    H/o grave's disease  -- s/p total thyroidectomy with parathyroid autotransplant  -- c/w PTA synthroid. TSH wnl    Obesity  -- BMI:36.05 kg/m2        Diet: Combination Diet Regular Diet Adult    DVT Prophylaxis: Pneumatic Compression Devices  Tabares Catheter: Not present  Lines: None     Cardiac Monitoring: None  Code Status: Full Code      Clinically Significant Risk Factors        # Hypokalemia: Lowest K = 3.2 mmol/L in last 2 days, will replace as needed   # Hypocalcemia: Lowest Ca = 7.4 mg/dL in last 2 days, will monitor and replace as appropriate     # Hypoalbuminemia: Lowest albumin = 3.2 g/dL at 11/22/2023  8:40 AM, will monitor as appropriate            # Obesity: Estimated body mass index is 36.05 kg/m  as calculated from the following:    Height as of this encounter: 1.626 m (5' 4\").    Weight as of this encounter: 95.3 kg (210 lb)., PRESENT ON ADMISSION            Disposition Plan      Expected Discharge Date: 11/24/2023                    Courtney Muñoz MD  Hospitalist Service  [facility]  Securely message with [employer] (more info)  Text page via [employer] Paging/Directory "   ______________________________________________________________________    Interval History   Patient is seen and examined at bedside.  Pt has abdominal discomfort, generalized weakness, diarrhea. Tmax: 100.7    Physical Exam   Vital Signs: Temp: 99.4  F (37.4  C) Temp src: Oral BP: 109/56 Pulse: 91   Resp: 18 SpO2: 99 % O2 Device: None (Room air)    Weight: 210 lbs 0 oz    GEN: Alert and oriented. Not in acute distress.  HEENT: Atraumatic, mucous membrane- moist and pink.  Chest: Bilateral air entry.  CVS: S1S2 regular.   Abdomen: Soft. Right abd. Tenderness. No organomegaly. No guarding or rigidity. Bowel sounds active.   Extremities: No pedal edema.  CNS: No involuntary movements.  Skin: no cyanosis or clubbing.     Medical Decision Making             Data

## 2023-11-23 NOTE — PLAN OF CARE
Goal Outcome Evaluation:                  Able to make needs known. Up independently in room. Pain 2/10 at right lower abdominal quadrant and with movement increases and radiates around back. Nausea mild. Appetite poor. IV fluids running. Up to shower this morning. Bowel sounds present and active. Drinking water. Abx changed from zosyn to ceftriaxone following bc sensitivity results.

## 2023-11-23 NOTE — PROGRESS NOTES
Report called to P2 nurse.  Patient belongings and medications sent with the patient.  Patient will notify family of the room transfer.

## 2023-11-23 NOTE — PLAN OF CARE
Problem: Adult Inpatient Plan of Care  Goal: Absence of Hospital-Acquired Illness or Injury  Intervention: Prevent Skin Injury  Recent Flowsheet Documentation  Taken 11/22/2023 1500 by Cindy Guillaume RN  Body Position: position changed independently     Problem: Adult Inpatient Plan of Care  Goal: Optimal Comfort and Wellbeing  Outcome: Progressing   Goal Outcome Evaluation:             Patient is independent in room.  Patient remains on IVF and is able to unplug her pole and ambulate in the room. Patient required IV zofran for nausea, and given oxycodone and Imodium X1 on this shift.  Patient is pleasant and A&OX4.

## 2023-11-23 NOTE — PLAN OF CARE
Goal Outcome Evaluation:         VSS.  Continued diarrhea. Continued abdominal pain and intermittent nausea.  Dilaudid and zofran prn.  Tylenol scheduled.  Imodium prn. IV Fluids.  IV Antibx's. K+ replacement.  Care plan reviewed  Problem: Adult Inpatient Plan of Care  Goal: Absence of Hospital-Acquired Illness or Injury  Intervention: Identify and Manage Fall Risk  Recent Flowsheet Documentation  Taken 11/23/2023 0000 by Katherine Mullen RN  Safety Promotion/Fall Prevention: nonskid shoes/slippers when out of bed  Intervention: Prevent Skin Injury  Recent Flowsheet Documentation  Taken 11/23/2023 0000 by Katherine Mullen, RN  Body Position: position changed independently   .

## 2023-11-24 LAB
ADV 40+41 DNA STL QL NAA+NON-PROBE: NEGATIVE
ANION GAP SERPL CALCULATED.3IONS-SCNC: 13 MMOL/L (ref 7–15)
ASTRO TYP 1-8 RNA STL QL NAA+NON-PROBE: NEGATIVE
BACTERIA BLD CULT: ABNORMAL
BACTERIA BLD CULT: ABNORMAL
BASOPHILS # BLD AUTO: 0 10E3/UL (ref 0–0.2)
BASOPHILS NFR BLD AUTO: 0 %
BUN SERPL-MCNC: 4.4 MG/DL (ref 6–20)
C CAYETANENSIS DNA STL QL NAA+NON-PROBE: NEGATIVE
CALCIUM SERPL-MCNC: 7 MG/DL (ref 8.6–10)
CAMPYLOBACTER DNA SPEC NAA+PROBE: NEGATIVE
CHLORIDE SERPL-SCNC: 107 MMOL/L (ref 98–107)
CREAT SERPL-MCNC: 0.82 MG/DL (ref 0.51–0.95)
CRYPTOSP DNA STL QL NAA+NON-PROBE: NEGATIVE
DEPRECATED HCO3 PLAS-SCNC: 23 MMOL/L (ref 22–29)
E COLI O157 DNA STL QL NAA+NON-PROBE: NORMAL
E HISTOLYT DNA STL QL NAA+NON-PROBE: NEGATIVE
EAEC ASTA GENE ISLT QL NAA+PROBE: NEGATIVE
EC STX1+STX2 GENES STL QL NAA+NON-PROBE: NEGATIVE
EGFRCR SERPLBLD CKD-EPI 2021: >90 ML/MIN/1.73M2
EOSINOPHIL # BLD AUTO: 0 10E3/UL (ref 0–0.7)
EOSINOPHIL NFR BLD AUTO: 0 %
EPEC EAE GENE STL QL NAA+NON-PROBE: NEGATIVE
ERYTHROCYTE [DISTWIDTH] IN BLOOD BY AUTOMATED COUNT: 13.1 % (ref 10–15)
ETEC LTA+ST1A+ST1B TOX ST NAA+NON-PROBE: NEGATIVE
G LAMBLIA DNA STL QL NAA+NON-PROBE: NEGATIVE
GLUCOSE SERPL-MCNC: 112 MG/DL (ref 70–99)
HBA1C MFR BLD: 5.4 %
HCT VFR BLD AUTO: 29.8 % (ref 35–47)
HGB BLD-MCNC: 10.1 G/DL (ref 11.7–15.7)
IMM GRANULOCYTES # BLD: 0.1 10E3/UL
IMM GRANULOCYTES NFR BLD: 1 %
LYMPHOCYTES # BLD AUTO: 2.4 10E3/UL (ref 0.8–5.3)
LYMPHOCYTES NFR BLD AUTO: 25 %
MAGNESIUM SERPL-MCNC: 1.8 MG/DL (ref 1.7–2.3)
MCH RBC QN AUTO: 28.7 PG (ref 26.5–33)
MCHC RBC AUTO-ENTMCNC: 33.9 G/DL (ref 31.5–36.5)
MCV RBC AUTO: 85 FL (ref 78–100)
MONOCYTES # BLD AUTO: 1.4 10E3/UL (ref 0–1.3)
MONOCYTES NFR BLD AUTO: 15 %
NEUTROPHILS # BLD AUTO: 5.6 10E3/UL (ref 1.6–8.3)
NEUTROPHILS NFR BLD AUTO: 59 %
NOROVIRUS GI+II RNA STL QL NAA+NON-PROBE: NEGATIVE
NRBC # BLD AUTO: 0 10E3/UL
NRBC BLD AUTO-RTO: 0 /100
P SHIGELLOIDES DNA STL QL NAA+NON-PROBE: NEGATIVE
PLATELET # BLD AUTO: 360 10E3/UL (ref 150–450)
POTASSIUM SERPL-SCNC: 3.4 MMOL/L (ref 3.4–5.3)
POTASSIUM SERPL-SCNC: 3.7 MMOL/L (ref 3.4–5.3)
RBC # BLD AUTO: 3.52 10E6/UL (ref 3.8–5.2)
RVA RNA STL QL NAA+NON-PROBE: NEGATIVE
SALMONELLA SP RPOD STL QL NAA+PROBE: NEGATIVE
SAPO I+II+IV+V RNA STL QL NAA+NON-PROBE: NEGATIVE
SHIGELLA SP+EIEC IPAH ST NAA+NON-PROBE: NEGATIVE
SODIUM SERPL-SCNC: 143 MMOL/L (ref 135–145)
V CHOLERAE DNA SPEC QL NAA+PROBE: NEGATIVE
VIBRIO DNA SPEC NAA+PROBE: NEGATIVE
WBC # BLD AUTO: 9.5 10E3/UL (ref 4–11)
Y ENTEROCOL DNA STL QL NAA+PROBE: NEGATIVE

## 2023-11-24 PROCEDURE — 85025 COMPLETE CBC W/AUTO DIFF WBC: CPT | Performed by: STUDENT IN AN ORGANIZED HEALTH CARE EDUCATION/TRAINING PROGRAM

## 2023-11-24 PROCEDURE — 250N000011 HC RX IP 250 OP 636: Mod: JZ | Performed by: STUDENT IN AN ORGANIZED HEALTH CARE EDUCATION/TRAINING PROGRAM

## 2023-11-24 PROCEDURE — 83735 ASSAY OF MAGNESIUM: CPT | Performed by: STUDENT IN AN ORGANIZED HEALTH CARE EDUCATION/TRAINING PROGRAM

## 2023-11-24 PROCEDURE — 250N000013 HC RX MED GY IP 250 OP 250 PS 637: Performed by: STUDENT IN AN ORGANIZED HEALTH CARE EDUCATION/TRAINING PROGRAM

## 2023-11-24 PROCEDURE — 258N000003 HC RX IP 258 OP 636: Performed by: STUDENT IN AN ORGANIZED HEALTH CARE EDUCATION/TRAINING PROGRAM

## 2023-11-24 PROCEDURE — 250N000013 HC RX MED GY IP 250 OP 250 PS 637: Performed by: INTERNAL MEDICINE

## 2023-11-24 PROCEDURE — 120N000001 HC R&B MED SURG/OB

## 2023-11-24 PROCEDURE — 250N000013 HC RX MED GY IP 250 OP 250 PS 637

## 2023-11-24 PROCEDURE — 99232 SBSQ HOSP IP/OBS MODERATE 35: CPT | Performed by: STUDENT IN AN ORGANIZED HEALTH CARE EDUCATION/TRAINING PROGRAM

## 2023-11-24 PROCEDURE — 83036 HEMOGLOBIN GLYCOSYLATED A1C: CPT | Performed by: STUDENT IN AN ORGANIZED HEALTH CARE EDUCATION/TRAINING PROGRAM

## 2023-11-24 PROCEDURE — 80048 BASIC METABOLIC PNL TOTAL CA: CPT | Performed by: STUDENT IN AN ORGANIZED HEALTH CARE EDUCATION/TRAINING PROGRAM

## 2023-11-24 PROCEDURE — 36415 COLL VENOUS BLD VENIPUNCTURE: CPT | Performed by: STUDENT IN AN ORGANIZED HEALTH CARE EDUCATION/TRAINING PROGRAM

## 2023-11-24 PROCEDURE — 84132 ASSAY OF SERUM POTASSIUM: CPT | Performed by: STUDENT IN AN ORGANIZED HEALTH CARE EDUCATION/TRAINING PROGRAM

## 2023-11-24 RX ORDER — HYDROXYZINE HYDROCHLORIDE 50 MG/1
50 TABLET, FILM COATED ORAL EVERY 6 HOURS PRN
Status: DISCONTINUED | OUTPATIENT
Start: 2023-11-24 | End: 2023-11-27 | Stop reason: HOSPADM

## 2023-11-24 RX ORDER — POTASSIUM CHLORIDE 1500 MG/1
40 TABLET, EXTENDED RELEASE ORAL ONCE
Status: COMPLETED | OUTPATIENT
Start: 2023-11-24 | End: 2023-11-24

## 2023-11-24 RX ORDER — HYDROXYZINE HYDROCHLORIDE 25 MG/1
25 TABLET, FILM COATED ORAL EVERY 6 HOURS PRN
Status: DISCONTINUED | OUTPATIENT
Start: 2023-11-24 | End: 2023-11-27 | Stop reason: HOSPADM

## 2023-11-24 RX ORDER — HYDROXYZINE HYDROCHLORIDE 25 MG/1
25 TABLET, FILM COATED ORAL ONCE
Status: COMPLETED | OUTPATIENT
Start: 2023-11-24 | End: 2023-11-24

## 2023-11-24 RX ADMIN — LEVOTHYROXINE SODIUM 112 MCG: 0.11 TABLET ORAL at 09:34

## 2023-11-24 RX ADMIN — VALACYCLOVIR HYDROCHLORIDE 1000 MG: 500 TABLET, FILM COATED ORAL at 09:34

## 2023-11-24 RX ADMIN — ACETAMINOPHEN 975 MG: 325 TABLET, FILM COATED ORAL at 03:17

## 2023-11-24 RX ADMIN — ACETAMINOPHEN 975 MG: 325 TABLET, FILM COATED ORAL at 19:39

## 2023-11-24 RX ADMIN — CEFTRIAXONE SODIUM 2 G: 2 INJECTION, POWDER, FOR SOLUTION INTRAMUSCULAR; INTRAVENOUS at 14:44

## 2023-11-24 RX ADMIN — SPIRONOLACTONE 100 MG: 100 TABLET ORAL at 17:56

## 2023-11-24 RX ADMIN — OXYCODONE HYDROCHLORIDE 2.5 MG: 5 TABLET ORAL at 14:40

## 2023-11-24 RX ADMIN — HYDROXYZINE HYDROCHLORIDE 25 MG: 25 TABLET, FILM COATED ORAL at 06:37

## 2023-11-24 RX ADMIN — OXYCODONE HYDROCHLORIDE 5 MG: 5 TABLET ORAL at 06:36

## 2023-11-24 RX ADMIN — HYDROXYZINE HYDROCHLORIDE 25 MG: 25 TABLET, FILM COATED ORAL at 19:39

## 2023-11-24 RX ADMIN — SODIUM CHLORIDE: 9 INJECTION, SOLUTION INTRAVENOUS at 09:41

## 2023-11-24 RX ADMIN — SPIRONOLACTONE 50 MG: 50 TABLET ORAL at 09:28

## 2023-11-24 RX ADMIN — ACETAMINOPHEN 975 MG: 325 TABLET, FILM COATED ORAL at 14:34

## 2023-11-24 RX ADMIN — POTASSIUM CHLORIDE 40 MEQ: 1500 TABLET, EXTENDED RELEASE ORAL at 09:28

## 2023-11-24 ASSESSMENT — ACTIVITIES OF DAILY LIVING (ADL)
ADLS_ACUITY_SCORE: 24

## 2023-11-24 NOTE — PLAN OF CARE
Goal Outcome Evaluation:       Alert and oriented. Reported no difficulty breathing or numbness/tingling. Symptoms overnight resolved. Tired and wanted to nap. Cares clustered. Continue IV fluids, IV abx, monitor electrolytes. Potassium replaced via protocol. No nausea, still having some difficulty eating due to abdominal pain. Ate a piece of quesadilla and felt lower right abdominal aching and had to stop eating. Drinking plenty of fluid.

## 2023-11-24 NOTE — PROGRESS NOTES
"6:33 AM    S: House staff was called by the patient's nurse due to concerns of feeling hot and having shortness of breath and abdominal pain.. Briefly, the patient was admitted for sepsis and acute right pyelonephritis.    I went to evaluate the patient and found her laying in bed, anxious appearing. She states she hasn't been able to sleep at all. She feels warm, with right sided abdominal pain and diarrhea. Also describes a tingling sensation throughout her body.     Exam: /55 (BP Location: Left arm)   Pulse 58   Temp 98.4  F (36.9  C) (Oral)   Resp 18   Ht 1.626 m (5' 4\")   Wt 95.3 kg (210 lb)   LMP 11/14/2023   SpO2 100%   BMI 36.05 kg/m    GENERAL: alert and oriented, anxious appearing  RESP: CTAB  CV: extremities well perfused  ABDOMEN: right CVA tenderness; abdomen soft, non tender  PSYCH: anxious appearing        A/P:   Patient endorsing right flank pain. Currently being treated with Ceftriaxone for acute pyelonephritis and on IV fluids. Has Oxy available for pain PRN.     Patients symptomology very anxious appearing with shortness of breath, tingling sensation throughout body. Vitally has remained stable. Will trial hydroxyzine for anxiety.     Lab to draw morning labs now.     [facility]     7:17 AM  Morning labs reassuring. No white count. Hemoglogin/hematocrit appear to be at baseline. BMP stable from yesterday.         "

## 2023-11-24 NOTE — PROGRESS NOTES
"[facility]    Medicine Progress Note - Hospitalist Service    Date of Admission:  11/21/2023    Assessment & Plan   Lanie Manuel Is a 37 yo female with PMH of Graves' disease status post total thyroidectomy with parathyroid autotransplant, sickle cell trait, obesity who presented with abdominal pain nausea and vomiting for 4 days.    Sepsis  Acute right pyelonephritis  --Fever, abdominal pain, nausea and vomiting  -- Urine culture pending  -- Blood culture growing E. coli   -- S/p IV Zosyn. Currently on ceftriaxone per sensitivity   -- IV fluids  -- Analgesics    Lactic acidosis-resolved  Leukocytosis-improving    Hypokalemia  -- likely 2/2 GI loss.   -- Replete per protocol    Anemia  Sickle cell trait  -- no e/o bleeding  -- on IVF- dilution may play a role  -- monitor cbc    Hyperglycemia  -- likely stress related  -- A1c: 5.4%    Anxiety  -- Atarax prn    H/o grave's disease  -- s/p total thyroidectomy with parathyroid autotransplant  -- c/w PTA synthroid. TSH wnl    Obesity  -- BMI:36.05 kg/m2        Diet: Combination Diet Regular Diet Adult    DVT Prophylaxis: Pneumatic Compression Devices  Tabares Catheter: Not present  Lines: None     Cardiac Monitoring: None  Code Status: Full Code      Clinically Significant Risk Factors        # Hypokalemia: Lowest K = 3.2 mmol/L in last 2 days, will replace as needed       # Hypoalbuminemia: Lowest albumin = 3.2 g/dL at 11/22/2023  8:40 AM, will monitor as appropriate            # Obesity: Estimated body mass index is 36.05 kg/m  as calculated from the following:    Height as of this encounter: 1.626 m (5' 4\").    Weight as of this encounter: 95.3 kg (210 lb)., PRESENT ON ADMISSION            Disposition Plan     Expected Discharge Date: 11/24/2023                    Courtney Muñoz MD  Hospitalist Service  [facility]  Securely message with [employer] (more info)  Text page via [employer] Paging/Directory "   ______________________________________________________________________    Interval History   Patient is seen and examined at bedside.  Pt has  generalized weakness, diarrhea. Tmax: 100.2  Was anxious earlier. Responded well to atarax.    Physical Exam   Vital Signs: Temp: 98.2  F (36.8  C) Temp src: Oral BP: 102/56 Pulse: 50   Resp: 18 SpO2: 99 % O2 Device: None (Room air)    Weight: 210 lbs 0 oz    GEN: Alert and oriented. Not in acute distress.  HEENT: Atraumatic, mucous membrane- moist and pink.  Chest: Bilateral air entry.  CVS: S1S2 regular.   Abdomen: Soft. Right abd. Tenderness. No organomegaly. No guarding or rigidity. Bowel sounds active.   Extremities: No pedal edema.  CNS: No involuntary movements.  Skin: no cyanosis or clubbing.     Medical Decision Making             Data

## 2023-11-24 NOTE — PLAN OF CARE
Problem: Adult Inpatient Plan of Care  Goal: Optimal Comfort and Wellbeing  Outcome: Progressing  Intervention: Monitor Pain and Promote Comfort  Recent Flowsheet Documentation  Taken 11/23/2023 1712 by Silvia Hamilton, RN  Pain Management Interventions: medication (see MAR)  Taken 11/23/2023 1526 by Silvia Hamilton, RN  Pain Management Interventions: declines     Problem: Infection  Goal: Absence of Infection Signs and Symptoms  Outcome: Progressing   Goal Outcome Evaluation:       Patient complained of chest pain, had loose stool and emesis, rated pain 4/10, MD was texted, came to see, states pain is not heart related, compazine was ordered for nausea, was given prn, oxycodone was given for chest and abdominal pain, was helpful, Enteric Bacteria and Virus Panel PCR ordered, no stool yet, has low grade temp 99.4, remains on antibiotics, took a shower.

## 2023-11-24 NOTE — PLAN OF CARE
Problem: Adult Inpatient Plan of Care  Goal: Optimal Comfort and Wellbeing  Outcome: Progressing   Goal Outcome Evaluation:       Pt is alert and oriented, able to make needs known. Pt denies any pain during the night. Getting iv fludis. Tolerating regular diet, no nausea noted.Pt c/o hot sweats. Temp was 99.4 recheck 98.4. Pt also c/o sob and tingling to fingers. VSS. Resident notifed and came to assess pt. One time dose of atarax ordered and given along with prn oxycodone. Symptoms present like a panic attack.  Crystal Delgado RN

## 2023-11-25 LAB
ANION GAP SERPL CALCULATED.3IONS-SCNC: 10 MMOL/L (ref 7–15)
BASOPHILS # BLD AUTO: ABNORMAL 10*3/UL
BASOPHILS # BLD MANUAL: 0 10E3/UL (ref 0–0.2)
BASOPHILS NFR BLD AUTO: ABNORMAL %
BASOPHILS NFR BLD MANUAL: 0 %
BUN SERPL-MCNC: 4.6 MG/DL (ref 6–20)
CALCIUM SERPL-MCNC: 6.9 MG/DL (ref 8.6–10)
CHLORIDE SERPL-SCNC: 108 MMOL/L (ref 98–107)
CREAT SERPL-MCNC: 0.71 MG/DL (ref 0.51–0.95)
DEPRECATED HCO3 PLAS-SCNC: 25 MMOL/L (ref 22–29)
EGFRCR SERPLBLD CKD-EPI 2021: >90 ML/MIN/1.73M2
EOSINOPHIL # BLD AUTO: ABNORMAL 10*3/UL
EOSINOPHIL # BLD MANUAL: 0 10E3/UL (ref 0–0.7)
EOSINOPHIL NFR BLD AUTO: ABNORMAL %
EOSINOPHIL NFR BLD MANUAL: 0 %
ERYTHROCYTE [DISTWIDTH] IN BLOOD BY AUTOMATED COUNT: 13.2 % (ref 10–15)
GLUCOSE SERPL-MCNC: 96 MG/DL (ref 70–99)
HCT VFR BLD AUTO: 28.4 % (ref 35–47)
HGB BLD-MCNC: 9.5 G/DL (ref 11.7–15.7)
IMM GRANULOCYTES # BLD: ABNORMAL 10*3/UL
IMM GRANULOCYTES NFR BLD: ABNORMAL %
LYMPHOCYTES # BLD AUTO: ABNORMAL 10*3/UL
LYMPHOCYTES # BLD MANUAL: 2.4 10E3/UL (ref 0.8–5.3)
LYMPHOCYTES NFR BLD AUTO: ABNORMAL %
LYMPHOCYTES NFR BLD MANUAL: 23 %
MAGNESIUM SERPL-MCNC: 1.8 MG/DL (ref 1.7–2.3)
MCH RBC QN AUTO: 28.9 PG (ref 26.5–33)
MCHC RBC AUTO-ENTMCNC: 33.5 G/DL (ref 31.5–36.5)
MCV RBC AUTO: 86 FL (ref 78–100)
MONOCYTES # BLD AUTO: ABNORMAL 10*3/UL
MONOCYTES # BLD MANUAL: 0.6 10E3/UL (ref 0–1.3)
MONOCYTES NFR BLD AUTO: ABNORMAL %
MONOCYTES NFR BLD MANUAL: 6 %
NEUTROPHILS # BLD AUTO: ABNORMAL 10*3/UL
NEUTROPHILS # BLD MANUAL: 7.5 10E3/UL (ref 1.6–8.3)
NEUTROPHILS NFR BLD AUTO: ABNORMAL %
NEUTROPHILS NFR BLD MANUAL: 71 %
NRBC # BLD AUTO: 0 10E3/UL
NRBC BLD AUTO-RTO: 0 /100
PLAT MORPH BLD: ABNORMAL
PLATELET # BLD AUTO: 421 10E3/UL (ref 150–450)
POTASSIUM SERPL-SCNC: 4 MMOL/L (ref 3.4–5.3)
RBC # BLD AUTO: 3.29 10E6/UL (ref 3.8–5.2)
RBC MORPH BLD: ABNORMAL
SODIUM SERPL-SCNC: 143 MMOL/L (ref 135–145)
TARGETS BLD QL SMEAR: SLIGHT
WBC # BLD AUTO: 10.6 10E3/UL (ref 4–11)

## 2023-11-25 PROCEDURE — 250N000013 HC RX MED GY IP 250 OP 250 PS 637: Performed by: HOSPITALIST

## 2023-11-25 PROCEDURE — 85007 BL SMEAR W/DIFF WBC COUNT: CPT | Performed by: STUDENT IN AN ORGANIZED HEALTH CARE EDUCATION/TRAINING PROGRAM

## 2023-11-25 PROCEDURE — 250N000011 HC RX IP 250 OP 636: Mod: JZ | Performed by: STUDENT IN AN ORGANIZED HEALTH CARE EDUCATION/TRAINING PROGRAM

## 2023-11-25 PROCEDURE — 99232 SBSQ HOSP IP/OBS MODERATE 35: CPT | Performed by: STUDENT IN AN ORGANIZED HEALTH CARE EDUCATION/TRAINING PROGRAM

## 2023-11-25 PROCEDURE — 36415 COLL VENOUS BLD VENIPUNCTURE: CPT | Performed by: STUDENT IN AN ORGANIZED HEALTH CARE EDUCATION/TRAINING PROGRAM

## 2023-11-25 PROCEDURE — 83735 ASSAY OF MAGNESIUM: CPT | Performed by: STUDENT IN AN ORGANIZED HEALTH CARE EDUCATION/TRAINING PROGRAM

## 2023-11-25 PROCEDURE — 120N000001 HC R&B MED SURG/OB

## 2023-11-25 PROCEDURE — 85027 COMPLETE CBC AUTOMATED: CPT | Performed by: STUDENT IN AN ORGANIZED HEALTH CARE EDUCATION/TRAINING PROGRAM

## 2023-11-25 PROCEDURE — 250N000013 HC RX MED GY IP 250 OP 250 PS 637: Performed by: STUDENT IN AN ORGANIZED HEALTH CARE EDUCATION/TRAINING PROGRAM

## 2023-11-25 PROCEDURE — 80048 BASIC METABOLIC PNL TOTAL CA: CPT | Performed by: STUDENT IN AN ORGANIZED HEALTH CARE EDUCATION/TRAINING PROGRAM

## 2023-11-25 PROCEDURE — 258N000003 HC RX IP 258 OP 636: Performed by: STUDENT IN AN ORGANIZED HEALTH CARE EDUCATION/TRAINING PROGRAM

## 2023-11-25 PROCEDURE — 250N000013 HC RX MED GY IP 250 OP 250 PS 637: Performed by: INTERNAL MEDICINE

## 2023-11-25 RX ORDER — SODIUM CHLORIDE 9 MG/ML
INJECTION, SOLUTION INTRAVENOUS CONTINUOUS
Status: DISCONTINUED | OUTPATIENT
Start: 2023-11-25 | End: 2023-11-26

## 2023-11-25 RX ADMIN — VALACYCLOVIR HYDROCHLORIDE 1000 MG: 500 TABLET, FILM COATED ORAL at 09:13

## 2023-11-25 RX ADMIN — ACETAMINOPHEN 975 MG: 325 TABLET, FILM COATED ORAL at 14:11

## 2023-11-25 RX ADMIN — SODIUM CHLORIDE: 9 INJECTION, SOLUTION INTRAVENOUS at 15:22

## 2023-11-25 RX ADMIN — SPIRONOLACTONE 50 MG: 50 TABLET ORAL at 09:13

## 2023-11-25 RX ADMIN — CEFTRIAXONE SODIUM 2 G: 2 INJECTION, POWDER, FOR SOLUTION INTRAMUSCULAR; INTRAVENOUS at 14:12

## 2023-11-25 RX ADMIN — LOPERAMIDE HYDROCHLORIDE 2 MG: 2 CAPSULE ORAL at 14:11

## 2023-11-25 RX ADMIN — LEVOTHYROXINE SODIUM 112 MCG: 0.11 TABLET ORAL at 09:13

## 2023-11-25 ASSESSMENT — ACTIVITIES OF DAILY LIVING (ADL)
ADLS_ACUITY_SCORE: 25
ADLS_ACUITY_SCORE: 24
ADLS_ACUITY_SCORE: 24
ADLS_ACUITY_SCORE: 25
ADLS_ACUITY_SCORE: 24
ADLS_ACUITY_SCORE: 25

## 2023-11-25 NOTE — PROGRESS NOTES
"[facility]    Medicine Progress Note - Hospitalist Service    Date of Admission:  11/21/2023    Assessment & Plan   Lanie Manuel Is a 39 yo female with PMH of Graves' disease status post total thyroidectomy with parathyroid autotransplant, sickle cell trait, obesity who presented with abdominal pain nausea and vomiting for 4 days.    Sepsis  Acute right pyelonephritis  --Fever, abdominal pain, nausea and vomiting  -- Urine culture mixed jeniffer  -- Blood culture growing E. coli   -- S/p IV Zosyn. Currently on ceftriaxone per sensitivity   -- IV fluids  -- Analgesics    Lactic acidosis-resolved  Leukocytosis-improving    Hypokalemia  -- likely 2/2 GI loss.   -- Replete per protocol    Anemia  Sickle cell trait  -- no e/o bleeding  -- on IVF- dilution may play a role  -- monitor cbc    Hyperglycemia  -- likely stress related  -- A1c: 5.4%    Anxiety  -- Atarax prn    H/o grave's disease  -- s/p total thyroidectomy with parathyroid autotransplant  -- c/w PTA synthroid. TSH wnl    Obesity  -- BMI:36.05 kg/m2        Diet: Combination Diet Regular Diet Adult    DVT Prophylaxis: Pneumatic Compression Devices  Tabares Catheter: Not present  Lines: None     Cardiac Monitoring: None  Code Status: Full Code      Clinically Significant Risk Factors              # Hypoalbuminemia: Lowest albumin = 3.2 g/dL at 11/22/2023  8:40 AM, will monitor as appropriate            # Obesity: Estimated body mass index is 36.05 kg/m  as calculated from the following:    Height as of this encounter: 1.626 m (5' 4\").    Weight as of this encounter: 95.3 kg (210 lb).             Disposition Plan     Expected Discharge Date: 11/25/2023                    Courtney Muñoz MD  Hospitalist Service  [facility]  Securely message with [employer] (more info)  Text page via [employer] Paging/Directory   ______________________________________________________________________    Interval History   Patient is seen " and examined at bedside.  Pt has  generalized weakness and dizziness. No diarrhea since morning. Tmax: 99.7      Physical Exam   Vital Signs: Temp: 98.9  F (37.2  C) Temp src: Oral BP: 107/63 Pulse: 78   Resp: 18 SpO2: 98 % O2 Device: None (Room air)    Weight: 210 lbs 0 oz    GEN: Alert and oriented. Not in acute distress.  HEENT: Atraumatic, mucous membrane- moist and pink.  Chest: Bilateral air entry.  CVS: S1S2 regular.   Abdomen: Soft. Right abd. Tenderness. No organomegaly. No guarding or rigidity. Bowel sounds active.   Extremities: No pedal edema.  CNS: No involuntary movements.  Skin: no cyanosis or clubbing.     Medical Decision Making             Data

## 2023-11-25 NOTE — PLAN OF CARE
Problem: Adult Inpatient Plan of Care  Goal: Optimal Comfort and Wellbeing  Outcome: Progressing   Goal Outcome Evaluation:    Patient is A/O x4.  VSS on RA. Pt denies pain. Poor appetite, small amounts of food cause abdominal discomfort; pt ate two containers of chocolate ice cream this danuta; passes gas. Voiding indep.  No diarrhea danuta shift.   ml/hr

## 2023-11-25 NOTE — PLAN OF CARE
Goal Outcome Evaluation:             Feels a little better today, less pain, but still discomfort when eating in abdomen general and right lower quadrant. No nausea. Diarrhea in afternoon, loperamide requested and given. Tylenol for abdomen scheduled. Family brought food in. Not eating much. Drinking lots of fluids, water and juice. IV fluids at 75ml/hr. BP in 90's systolic and spironolactone held. She is having dizziness when getting up. Notified nurse coming on to monitor.

## 2023-11-25 NOTE — PLAN OF CARE
Neuro: A&Ox4  Respiratory: WDL  Cardiac:WDL  Diet: Regular  IV Access: Right lower forearm, saline locked  Pain: Denied  VS: Stable on RA  Activity: Up independently

## 2023-11-26 LAB
ANION GAP SERPL CALCULATED.3IONS-SCNC: 10 MMOL/L (ref 7–15)
BACTERIA BLD CULT: NO GROWTH
BASOPHILS # BLD AUTO: ABNORMAL 10*3/UL
BASOPHILS # BLD MANUAL: 0.1 10E3/UL (ref 0–0.2)
BASOPHILS NFR BLD AUTO: ABNORMAL %
BASOPHILS NFR BLD MANUAL: 1 %
BUN SERPL-MCNC: 4.2 MG/DL (ref 6–20)
CALCIUM SERPL-MCNC: 7.3 MG/DL (ref 8.6–10)
CHLORIDE SERPL-SCNC: 105 MMOL/L (ref 98–107)
CREAT SERPL-MCNC: 0.7 MG/DL (ref 0.51–0.95)
DEPRECATED HCO3 PLAS-SCNC: 29 MMOL/L (ref 22–29)
EGFRCR SERPLBLD CKD-EPI 2021: >90 ML/MIN/1.73M2
EOSINOPHIL # BLD AUTO: ABNORMAL 10*3/UL
EOSINOPHIL # BLD MANUAL: 0.1 10E3/UL (ref 0–0.7)
EOSINOPHIL NFR BLD AUTO: ABNORMAL %
EOSINOPHIL NFR BLD MANUAL: 1 %
ERYTHROCYTE [DISTWIDTH] IN BLOOD BY AUTOMATED COUNT: 13.2 % (ref 10–15)
GLUCOSE SERPL-MCNC: 94 MG/DL (ref 70–99)
HCT VFR BLD AUTO: 32.7 % (ref 35–47)
HGB BLD-MCNC: 10.5 G/DL (ref 11.7–15.7)
IMM GRANULOCYTES # BLD: ABNORMAL 10*3/UL
IMM GRANULOCYTES NFR BLD: ABNORMAL %
LYMPHOCYTES # BLD AUTO: ABNORMAL 10*3/UL
LYMPHOCYTES # BLD MANUAL: 1.3 10E3/UL (ref 0.8–5.3)
LYMPHOCYTES NFR BLD AUTO: ABNORMAL %
LYMPHOCYTES NFR BLD MANUAL: 16 %
MAGNESIUM SERPL-MCNC: 1.8 MG/DL (ref 1.7–2.3)
MCH RBC QN AUTO: 28 PG (ref 26.5–33)
MCHC RBC AUTO-ENTMCNC: 32.1 G/DL (ref 31.5–36.5)
MCV RBC AUTO: 87 FL (ref 78–100)
MONOCYTES # BLD AUTO: ABNORMAL 10*3/UL
MONOCYTES # BLD MANUAL: 0.8 10E3/UL (ref 0–1.3)
MONOCYTES NFR BLD AUTO: ABNORMAL %
MONOCYTES NFR BLD MANUAL: 10 %
NEUTROPHILS # BLD AUTO: ABNORMAL 10*3/UL
NEUTROPHILS # BLD MANUAL: 5.7 10E3/UL (ref 1.6–8.3)
NEUTROPHILS NFR BLD AUTO: ABNORMAL %
NEUTROPHILS NFR BLD MANUAL: 72 %
NRBC # BLD AUTO: 0 10E3/UL
NRBC BLD AUTO-RTO: 0 /100
PLAT MORPH BLD: NORMAL
PLATELET # BLD AUTO: 495 10E3/UL (ref 150–450)
POTASSIUM SERPL-SCNC: 3.9 MMOL/L (ref 3.4–5.3)
RBC # BLD AUTO: 3.75 10E6/UL (ref 3.8–5.2)
RBC MORPH BLD: NORMAL
SODIUM SERPL-SCNC: 144 MMOL/L (ref 135–145)
WBC # BLD AUTO: 7.9 10E3/UL (ref 4–11)

## 2023-11-26 PROCEDURE — 250N000013 HC RX MED GY IP 250 OP 250 PS 637: Performed by: STUDENT IN AN ORGANIZED HEALTH CARE EDUCATION/TRAINING PROGRAM

## 2023-11-26 PROCEDURE — 250N000013 HC RX MED GY IP 250 OP 250 PS 637: Performed by: INTERNAL MEDICINE

## 2023-11-26 PROCEDURE — 250N000011 HC RX IP 250 OP 636: Mod: JZ | Performed by: STUDENT IN AN ORGANIZED HEALTH CARE EDUCATION/TRAINING PROGRAM

## 2023-11-26 PROCEDURE — 83735 ASSAY OF MAGNESIUM: CPT | Performed by: STUDENT IN AN ORGANIZED HEALTH CARE EDUCATION/TRAINING PROGRAM

## 2023-11-26 PROCEDURE — 36415 COLL VENOUS BLD VENIPUNCTURE: CPT | Performed by: STUDENT IN AN ORGANIZED HEALTH CARE EDUCATION/TRAINING PROGRAM

## 2023-11-26 PROCEDURE — 85007 BL SMEAR W/DIFF WBC COUNT: CPT | Performed by: STUDENT IN AN ORGANIZED HEALTH CARE EDUCATION/TRAINING PROGRAM

## 2023-11-26 PROCEDURE — 250N000013 HC RX MED GY IP 250 OP 250 PS 637: Performed by: HOSPITALIST

## 2023-11-26 PROCEDURE — 80048 BASIC METABOLIC PNL TOTAL CA: CPT | Performed by: STUDENT IN AN ORGANIZED HEALTH CARE EDUCATION/TRAINING PROGRAM

## 2023-11-26 PROCEDURE — 99232 SBSQ HOSP IP/OBS MODERATE 35: CPT | Performed by: STUDENT IN AN ORGANIZED HEALTH CARE EDUCATION/TRAINING PROGRAM

## 2023-11-26 PROCEDURE — 120N000001 HC R&B MED SURG/OB

## 2023-11-26 PROCEDURE — 85027 COMPLETE CBC AUTOMATED: CPT | Performed by: STUDENT IN AN ORGANIZED HEALTH CARE EDUCATION/TRAINING PROGRAM

## 2023-11-26 RX ORDER — LEVOFLOXACIN 750 MG/1
750 TABLET, FILM COATED ORAL DAILY
Status: DISCONTINUED | OUTPATIENT
Start: 2023-11-27 | End: 2023-11-27 | Stop reason: HOSPADM

## 2023-11-26 RX ADMIN — VALACYCLOVIR HYDROCHLORIDE 1000 MG: 500 TABLET, FILM COATED ORAL at 08:24

## 2023-11-26 RX ADMIN — CEFTRIAXONE SODIUM 2 G: 2 INJECTION, POWDER, FOR SOLUTION INTRAMUSCULAR; INTRAVENOUS at 14:35

## 2023-11-26 RX ADMIN — SPIRONOLACTONE 100 MG: 100 TABLET ORAL at 16:49

## 2023-11-26 RX ADMIN — LEVOTHYROXINE SODIUM 112 MCG: 0.11 TABLET ORAL at 08:24

## 2023-11-26 RX ADMIN — SPIRONOLACTONE 50 MG: 50 TABLET ORAL at 08:24

## 2023-11-26 RX ADMIN — LOPERAMIDE HYDROCHLORIDE 2 MG: 2 CAPSULE ORAL at 00:07

## 2023-11-26 RX ADMIN — ACETAMINOPHEN 975 MG: 325 TABLET, FILM COATED ORAL at 23:53

## 2023-11-26 ASSESSMENT — ACTIVITIES OF DAILY LIVING (ADL)
ADLS_ACUITY_SCORE: 25
ADLS_ACUITY_SCORE: 22
ADLS_ACUITY_SCORE: 25
ADLS_ACUITY_SCORE: 22
ADLS_ACUITY_SCORE: 25
ADLS_ACUITY_SCORE: 25
ADLS_ACUITY_SCORE: 22

## 2023-11-26 NOTE — PROGRESS NOTES
"[facility]    Medicine Progress Note - Hospitalist Service    Date of Admission:  11/21/2023    Assessment & Plan   Lanie Manuel Is a 37 yo female with PMH of Graves' disease status post total thyroidectomy with parathyroid autotransplant, sickle cell trait, obesity who presented with abdominal pain nausea and vomiting for 4 days.    Sepsis  Acute severe right pyelonephritis  --Fever, abdominal pain, nausea and vomiting on presentation  -- Urine culture mixed jeniffer  -- Blood culture growing E. coli   -- S/p IV Zosyn and ceftriaxone per sensitivity. Will switch to Levaquin starting on 11/27 for 5 more days.  -- IV fluids  -- Analgesics    Lactic acidosis-resolved  Leukocytosis-improving    Watery diarrhea- resolving  -- stool negative for bacteria/virus  -- immodium prn  -- improving. Will discontinue IVF and encouraged po hydration.    Hypokalemia  -- likely 2/2 GI loss.   -- Replete per protocol    Anemia  Sickle cell trait  -- no e/o bleeding  -- s/p IVF- dilution may play a role  -- monitor cbc    Hyperglycemia  -- likely stress related  -- A1c: 5.4%    Anxiety  -- Atarax prn    H/o grave's disease  -- s/p total thyroidectomy with parathyroid autotransplant  -- c/w PTA synthroid. TSH wnl    Obesity  -- BMI:36.05 kg/m2        Diet: Combination Diet Regular Diet Adult    DVT Prophylaxis: Pneumatic Compression Devices  Tabares Catheter: Not present  Lines: None     Cardiac Monitoring: None  Code Status: Full Code      Clinically Significant Risk Factors              # Hypoalbuminemia: Lowest albumin = 3.2 g/dL at 11/22/2023  8:40 AM, will monitor as appropriate            # Obesity: Estimated body mass index is 36.05 kg/m  as calculated from the following:    Height as of this encounter: 1.626 m (5' 4\").    Weight as of this encounter: 95.3 kg (210 lb).             Disposition Plan      Expected Discharge Date: 11/27/2023                    Courtney Muñoz MD  Hospitalist Service  " [facility]  Securely message with [employer] (more info)  Text page via [employer] Paging/Directory   ______________________________________________________________________    Interval History   Patient is seen and examined at bedside.  Couple of episodes of diarrhea. Overall improving.Tmax 99.7      Physical Exam   Vital Signs: Temp: 98.3  F (36.8  C) Temp src: Oral BP: 123/68 Pulse: 67   Resp: 20 SpO2: 97 % O2 Device: None (Room air)    Weight: 210 lbs 0 oz    GEN: Alert and oriented. Not in acute distress.  HEENT: Atraumatic, mucous membrane- moist and pink.  Chest: Bilateral air entry.  CVS: S1S2 regular.   Abdomen: Soft. Right abd. Tenderness. No organomegaly. No guarding or rigidity. Bowel sounds active.   Extremities: No pedal edema.  CNS: No involuntary movements.  Skin: no cyanosis or clubbing.     Medical Decision Making             Data

## 2023-11-26 NOTE — PLAN OF CARE
Goal Outcome Evaluation:      Plan of Care Reviewed With: patient    Overall Patient Progress: no changeOverall Patient Progress: no change    Outcome Evaluation: x2 loose BM reported. Denies pain. Sleeps between cares. Remains afebrile.    Fei Sheth RN

## 2023-11-26 NOTE — PLAN OF CARE
Goal Outcome Evaluation:             Up independently, pain is under control. Still eating very small amounts of food because of resultant abdominal pain. No nausea.

## 2023-11-26 NOTE — PLAN OF CARE
Neuro: A&O x4  Respiratory: stable on RA  Cardiac: Afebrile  Diet: Regular  IV Access: Peripheral, Right, infusing- LR at 75mL/hr  Pain: Denied  VS: Stable  Activity: Independent     Pt had 2 loose stools I previous shift, given imodium per recommendation. 1 loose stool episode overnight.

## 2023-11-27 ENCOUNTER — HOSPITAL ENCOUNTER (INPATIENT)
Facility: HOSPITAL | Age: 38
End: 2023-11-27
Admitting: INTERNAL MEDICINE
Payer: COMMERCIAL

## 2023-11-27 VITALS
TEMPERATURE: 98.8 F | OXYGEN SATURATION: 98 % | HEIGHT: 64 IN | SYSTOLIC BLOOD PRESSURE: 119 MMHG | BODY MASS INDEX: 35.85 KG/M2 | WEIGHT: 210 LBS | DIASTOLIC BLOOD PRESSURE: 76 MMHG | RESPIRATION RATE: 18 BRPM | HEART RATE: 60 BPM

## 2023-11-27 LAB
ANION GAP SERPL CALCULATED.3IONS-SCNC: 9 MMOL/L (ref 7–15)
BASOPHILS # BLD AUTO: 0 10E3/UL (ref 0–0.2)
BASOPHILS NFR BLD AUTO: 0 %
BUN SERPL-MCNC: 7.3 MG/DL (ref 6–20)
CALCIUM SERPL-MCNC: 7.7 MG/DL (ref 8.6–10)
CHLORIDE SERPL-SCNC: 102 MMOL/L (ref 98–107)
CREAT SERPL-MCNC: 0.63 MG/DL (ref 0.51–0.95)
DEPRECATED HCO3 PLAS-SCNC: 26 MMOL/L (ref 22–29)
EGFRCR SERPLBLD CKD-EPI 2021: >90 ML/MIN/1.73M2
EOSINOPHIL # BLD AUTO: 0.1 10E3/UL (ref 0–0.7)
EOSINOPHIL NFR BLD AUTO: 1 %
ERYTHROCYTE [DISTWIDTH] IN BLOOD BY AUTOMATED COUNT: 13.1 % (ref 10–15)
GLUCOSE SERPL-MCNC: 93 MG/DL (ref 70–99)
HCT VFR BLD AUTO: 31 % (ref 35–47)
HGB BLD-MCNC: 10.3 G/DL (ref 11.7–15.7)
IMM GRANULOCYTES # BLD: 0.1 10E3/UL
IMM GRANULOCYTES NFR BLD: 1 %
LYMPHOCYTES # BLD AUTO: 2 10E3/UL (ref 0.8–5.3)
LYMPHOCYTES NFR BLD AUTO: 27 %
MAGNESIUM SERPL-MCNC: 1.9 MG/DL (ref 1.7–2.3)
MCH RBC QN AUTO: 28.4 PG (ref 26.5–33)
MCHC RBC AUTO-ENTMCNC: 33.2 G/DL (ref 31.5–36.5)
MCV RBC AUTO: 85 FL (ref 78–100)
MONOCYTES # BLD AUTO: 0.5 10E3/UL (ref 0–1.3)
MONOCYTES NFR BLD AUTO: 7 %
NEUTROPHILS # BLD AUTO: 4.9 10E3/UL (ref 1.6–8.3)
NEUTROPHILS NFR BLD AUTO: 64 %
NRBC # BLD AUTO: 0 10E3/UL
NRBC BLD AUTO-RTO: 0 /100
PLATELET # BLD AUTO: 587 10E3/UL (ref 150–450)
POTASSIUM SERPL-SCNC: 4 MMOL/L (ref 3.4–5.3)
RBC # BLD AUTO: 3.63 10E6/UL (ref 3.8–5.2)
SODIUM SERPL-SCNC: 137 MMOL/L (ref 135–145)
WBC # BLD AUTO: 7.6 10E3/UL (ref 4–11)

## 2023-11-27 PROCEDURE — 250N000013 HC RX MED GY IP 250 OP 250 PS 637: Performed by: INTERNAL MEDICINE

## 2023-11-27 PROCEDURE — 80048 BASIC METABOLIC PNL TOTAL CA: CPT | Performed by: STUDENT IN AN ORGANIZED HEALTH CARE EDUCATION/TRAINING PROGRAM

## 2023-11-27 PROCEDURE — 99239 HOSP IP/OBS DSCHRG MGMT >30: CPT | Performed by: INTERNAL MEDICINE

## 2023-11-27 PROCEDURE — 85025 COMPLETE CBC W/AUTO DIFF WBC: CPT | Performed by: STUDENT IN AN ORGANIZED HEALTH CARE EDUCATION/TRAINING PROGRAM

## 2023-11-27 PROCEDURE — 83735 ASSAY OF MAGNESIUM: CPT | Performed by: STUDENT IN AN ORGANIZED HEALTH CARE EDUCATION/TRAINING PROGRAM

## 2023-11-27 PROCEDURE — 250N000013 HC RX MED GY IP 250 OP 250 PS 637: Performed by: STUDENT IN AN ORGANIZED HEALTH CARE EDUCATION/TRAINING PROGRAM

## 2023-11-27 PROCEDURE — 36415 COLL VENOUS BLD VENIPUNCTURE: CPT | Performed by: STUDENT IN AN ORGANIZED HEALTH CARE EDUCATION/TRAINING PROGRAM

## 2023-11-27 RX ORDER — ACETAMINOPHEN 500 MG
500-1000 TABLET ORAL EVERY 6 HOURS PRN
COMMUNITY
Start: 2023-11-27

## 2023-11-27 RX ORDER — LEVOFLOXACIN 750 MG/1
750 TABLET, FILM COATED ORAL DAILY
Qty: 7 TABLET | Refills: 0 | Status: SHIPPED | OUTPATIENT
Start: 2023-11-28 | End: 2023-12-05

## 2023-11-27 RX ADMIN — LEVOTHYROXINE SODIUM 112 MCG: 0.11 TABLET ORAL at 10:03

## 2023-11-27 RX ADMIN — VALACYCLOVIR HYDROCHLORIDE 1000 MG: 500 TABLET, FILM COATED ORAL at 10:04

## 2023-11-27 RX ADMIN — SPIRONOLACTONE 50 MG: 50 TABLET ORAL at 10:02

## 2023-11-27 RX ADMIN — LEVOFLOXACIN 750 MG: 750 TABLET, FILM COATED ORAL at 10:01

## 2023-11-27 ASSESSMENT — ACTIVITIES OF DAILY LIVING (ADL)
ADLS_ACUITY_SCORE: 22

## 2023-11-27 NOTE — DISCHARGE SUMMARY
"[facility]  Hospitalist Discharge Summary      Date of Admission:  11/21/2023  Date of Discharge:  11/27/2023 12:20 PM  Discharging Provider: Ariadne Vences MD  Discharge Service: Hospitalist Service    Discharge Diagnoses   Sepsis  Acute severe right side pyelonephritis  Lactic acidosis  Diarrhea  Hypokalemia  Chronic anemia  Sickle cell trait  History of Graves' disease s/p total thyroidectomy with parathyroid autotransplant  Iatrogenic hypothyroidism  Obesity Body mass index is 36.05 kg/m .      Clinically Significant Risk Factors     # Obesity: Estimated body mass index is 36.05 kg/m  as calculated from the following:    Height as of this encounter: 1.626 m (5' 4\").    Weight as of this encounter: 95.3 kg (210 lb).       Follow-ups Needed After Discharge   Follow-up Appointments     Follow-up and recommended labs and tests       Follow up with primary care provider, [facility], within 7   days for hospital follow- up.  The following labs/tests are recommended:   CBC and BMP.            Unresulted Labs Ordered in the Past 30 Days of this Admission       No orders found from 10/22/2023 to 11/22/2023.        These results will be followed up by     Discharge Disposition   Discharged to home  Condition at discharge: Stable    Hospital Course   Lanie Manuel Is a 37 yo female with PMH of Graves' disease status post total thyroidectomy with parathyroid autotransplant, sickle cell trait, obesity who presented to [facility] with abdominal pain nausea and vomiting.  She was found to have acute severe right side pyelonephritis.  Urine culture showed mixed jeniffer, however blood culture was positive for E. coli.  She was treated with IV Zosyn and subsequently ceftriaxone and switched to oral Levaquin x 5 more days after discharge.  Her symptoms have been slowly improving.  Patient feels ready to be discharged home today.    Consultations This Hospital Stay "   None    Code Status   Prior    Time Spent on this Encounter   I, Ariadne Vencse MD, personally saw the patient today and spent greater than 30 minutes discharging this patient.       Ariadne Vences MD  [address] [address]  Phone: [phone]  Fax: [phone]  ______________________________________________________________________    Physical Exam   Vital Signs:                  Temperature 98.8, heart rate 60, /76, respiratory rate 18, O2 sats 98%  Weight: 210 lbs 0 oz  General Appearance: No acute distress  Respiratory: Respirations unlabored, lungs are clear  Cardiovascular: Regular rate and rhythm.  Normal S1-S2  GI: Abdomen soft, nondistended, nontender       Primary Care Physician   [facility]    Discharge Orders      Reason for your hospital stay    Pyelonephritis     Follow-up and recommended labs and tests     Follow up with primary care provider, [facility], within 7 days for hospital follow- up.  The following labs/tests are recommended: CBC and BMP.     Activity    Your activity upon discharge: activity as tolerated     Diet    Follow this diet upon discharge: Orders Placed This Encounter      Combination Diet Regular Diet Adult       Significant Results and Procedures   Most Recent 3 CBC's:  Recent Labs   Lab Test 11/27/23  0606 11/26/23  0656 11/25/23  0641   WBC 7.6 7.9 10.6   HGB 10.3* 10.5* 9.5*   MCV 85 87 86   * 495* 421     Most Recent 3 BMP's:  Recent Labs   Lab Test 11/27/23  0606 11/26/23  0656 11/25/23  0641    144 143   POTASSIUM 4.0 3.9 4.0   CHLORIDE 102 105 108*   CO2 26 29 25   BUN 7.3 4.2* 4.6*   CR 0.63 0.70 0.71   ANIONGAP 9 10 10   LOGAN 7.7* 7.3* 6.9*   GLC 93 94 96   ,   Results for orders placed or performed during the hospital encounter of 11/21/23   CT Abdomen Pelvis w Contrast    Narrative    EXAM: CT ABDOMEN PELVIS W CONTRAST  LOCATION: [facility]  HOSPITAL  DATE: 11/21/2023    INDICATION: diffuse abdominal pain, N V  COMPARISON: CT without contrast 08/26/2023  TECHNIQUE: CT scan of the abdomen and pelvis was performed following injection of IV contrast. Multiplanar reformats were obtained. Dose reduction techniques were used.  CONTRAST: [id number] 90ml    FINDINGS:   LOWER CHEST: Normal.    HEPATOBILIARY: Gallbladder removed. Normal liver.    PANCREAS: Normal.    SPLEEN: Normal.    ADRENAL GLANDS: Normal.    KIDNEYS/BLADDER: Severe pyelonephritis right kidney. No abscess. No hydronephrosis. No renal stones.    BOWEL: Normal.    LYMPH NODES: Normal.    VASCULATURE: Unremarkable.    PELVIC ORGANS: Normal.    MUSCULOSKELETAL: No significant findings. Tiny umbilical hernia containing fat only.      Impression    IMPRESSION:   1.  Severe right pyelonephritis without abscess.    2.  No renal stones or obstruction.       Discharge Medications   Discharge Medication List as of 11/27/2023 11:12 AM        START taking these medications    Details   acetaminophen (TYLENOL) 500 MG tablet Take 1-2 tablets (500-1,000 mg) by mouth every 6 hours as needed for mild pain or fever, OTC      levofloxacin (LEVAQUIN) 750 MG tablet Take 1 tablet (750 mg) by mouth daily for 7 days, Disp-7 tablet, R-0, E-Prescribe           CONTINUE these medications which have NOT CHANGED    Details   azelaic acid (FINACIA) 15 % external gel Apply topically every morningHistorical      levothyroxine (SYNTHROID/LEVOTHROID) 112 MCG tablet Take 112 mcg by mouth daily, Historical      phentermine (ADIPEX-P) 37.5 MG capsule Take 37.5 mg by mouth every morning, Historical      !! spironolactone (ALDACTONE) 50 MG tablet Take 50 mg by mouth every morning, Historical      !! spironolactone (ALDACTONE) 50 MG tablet Take 100 mg by mouth daily (with dinner), Historical      tretinoin (RETIN-A) 0.025 % external cream Apply topically at bedtimeHistorical      valACYclovir (VALTREX) 1000 mg tablet Take 1,000 mg by  mouth daily, Historical       !! - Potential duplicate medications found. Please discuss with provider.        Allergies   No Known Allergies

## 2023-11-27 NOTE — PROGRESS NOTES
Care Management Discharge Note    Discharge Date: 11/27/2023       Discharge Disposition:  home no needs  Discharge Services:  n/a  Discharge DME:  n/a  Discharge Transportation: n/a     Private pay costs discussed: Not applicable     Education Provided on the Discharge Plan:  yes  Persons Notified of Discharge Plans: yes  Patient/Family in Agreement with the Plan: yes     Handoff Referral Completed: Yes    Additional Information:  No needs anticipated from CM at discharge per pt; independent at baseline. Pt to follow up with PCP post discharge if needs arise. Family to transport home.  10:42 AM    JUDY Moran  11/27/2023

## 2023-11-27 NOTE — PLAN OF CARE
Problem: Infection  Goal: Absence of Infection Signs and Symptoms  Outcome: Progressing   Goal Outcome Evaluation:      Patient is A/O x4.  VSS on RA. Pt denies pain. No n/v; passes gas. Voiding indep.  Up indep; Reg diet, tolerating well, but pt still has poor appetite; plan to discharge with oral antibiotics; very pleasant pt looking forward to possible discharge

## 2023-11-27 NOTE — PLAN OF CARE
Neuro:   Alert & Oriented x4  Respiratory: Stable, on RA  Cardiac: WDL  Diet:  Regular  Incision/Drains: none  IV Access: PIV-L, saline locked  Pain: At the neck, Tylenol administered with relief  VS: Stable  Activity: Independent    Denied nausea/vomiting. 1 loose episode overnight.

## 2023-11-29 ENCOUNTER — PATIENT OUTREACH (OUTPATIENT)
Dept: CARE COORDINATION | Facility: CLINIC | Age: 38
End: 2023-11-29
Payer: COMMERCIAL

## 2023-11-29 NOTE — PROGRESS NOTES
Clinic Care Coordination Contact  [facility]: Post-Discharge Note  SITUATION                                                      Admission:    Admission Date: 11/21/23   Reason for Admission: Sepsis  Acute severe right side pyelonephritis  Lactic acidosis  Diarrhea  Hypokalemia  Chronic anemia  Sickle cell trait  History of Graves' disease s/p total thyroidectomy with parathyroid autotransplant  Iatrogenic hypothyroidism  Discharge:   Discharge Date: 11/27/23  Discharge Diagnosis: Sepsis  Acute severe right side pyelonephritis  Lactic acidosis  Diarrhea  Hypokalemia  Chronic anemia  Sickle cell trait  History of Graves' disease s/p total thyroidectomy with parathyroid autotransplant  Iatrogenic hypothyroidism    BACKGROUND                                                      Per hospital discharge summary and inpatient provider notes:    Lanie Manuel Is a 37 yo female with PMH of Graves' disease status post total thyroidectomy with parathyroid autotransplant, sickle cell trait, obesity who presented to [facility] with abdominal pain nausea and vomiting.  She was found to have acute severe right side pyelonephritis.  Urine culture showed mixed jeniffer, however blood culture was positive for E. coli.  She was treated with IV Zosyn and subsequently ceftriaxone and switched to oral Levaquin x 5 more days after discharge.  Her symptoms have been slowly improving.  Patient feels ready to be discharged home today.     ASSESSMENT           Discharge Assessment  How are you doing now that you are home?: doing well  How are your symptoms? (Red Flag symptoms escalate to triage hotline per guidelines): Improved  Do you feel your condition is stable enough to be safe at home until your provider visit?: Yes  Does the patient have their discharge instructions? : Yes  Does the patient have questions regarding their discharge instructions? : No  Were you started on any new medications or were there changes to  any of your previous medications? : Yes  Does the patient have all of their medications?: Yes  Do you have questions regarding any of your medications? : No  Do you have all of your needed medical supplies or equipment (DME)?  (i.e. oxygen tank, CPAP, cane, etc.): Yes  Discharge follow-up appointment scheduled within 14 calendar days? : Yes  Discharge Follow Up Appointment Date: 11/30/23  Discharge Follow Up Appointment Scheduled with?: Primary Care Provider    Post-op (W CTA Only)  If the patient had a surgery or procedure, do they have any questions for a nurse?: No         PLAN                                                      Outpatient Plan:      Follow-up Appointments     Follow-up and recommended labs and tests       Follow up with primary care provider, [facility], within 7   days for hospital follow- up.  The following labs/tests are recommended:   CBC and BMP.      Future Appointments   Date Time Provider Department Center   4/2/2024  2:00 PM Ayden Ribeiro MD [facility]     For any urgent concerns, please contact our 24 hour nurse triage line: [phone]     CHEPE Palacio  [phone]  [facility]

## 2024-04-02 ENCOUNTER — OFFICE VISIT (OUTPATIENT)
Dept: OPHTHALMOLOGY | Facility: CLINIC | Age: 39
End: 2024-04-02
Attending: OPHTHALMOLOGY
Payer: COMMERCIAL

## 2024-04-02 DIAGNOSIS — E05.00 PROPTOSIS DUE TO THYROID DISORDER: Primary | ICD-10-CM

## 2024-04-02 DIAGNOSIS — E07.9 THYROID EYE DISEASE: ICD-10-CM

## 2024-04-02 DIAGNOSIS — H57.89 THYROID EYE DISEASE: ICD-10-CM

## 2024-04-02 PROCEDURE — 92012 INTRM OPH EXAM EST PATIENT: CPT | Mod: GC | Performed by: OPHTHALMOLOGY

## 2024-04-02 PROCEDURE — 92285 EXTERNAL OCULAR PHOTOGRAPHY: CPT | Mod: 26 | Performed by: OPHTHALMOLOGY

## 2024-04-02 PROCEDURE — G0463 HOSPITAL OUTPT CLINIC VISIT: HCPCS | Performed by: OPHTHALMOLOGY

## 2024-04-02 PROCEDURE — 92285 EXTERNAL OCULAR PHOTOGRAPHY: CPT | Performed by: OPHTHALMOLOGY

## 2024-04-02 ASSESSMENT — LAGOPHTHALMOS
OD_LAGOPHTHALMOS: 1
OS_LAGOPHTHALMOS: 0

## 2024-04-02 ASSESSMENT — EXTERNAL EXAM - LEFT EYE: OS_EXAM: NORMAL

## 2024-04-02 ASSESSMENT — REFRACTION_WEARINGRX
OD_CYLINDER: SPHERE
SPECS_TYPE: SVL
OS_SPHERE: +1.00
OS_CYLINDER: SPHERE
OD_SPHERE: +1.00

## 2024-04-02 ASSESSMENT — VISUAL ACUITY
OS_CC+: -3
CORRECTION_TYPE: GLASSES
OS_CC: 20/25
OD_CC: 20/20
METHOD: SNELLEN - LINEAR

## 2024-04-02 ASSESSMENT — TONOMETRY
IOP_METHOD: SINGLE ICARE
OD_IOP_MMHG: 25
OS_IOP_MMHG: 17
OD_IOP_MMHG: 17
IOP_METHOD: UPGAZE ICARE
OS_IOP_MMHG: 23

## 2024-04-02 ASSESSMENT — CONF VISUAL FIELD
OD_NORMAL: 1
OD_INFERIOR_TEMPORAL_RESTRICTION: 0
OD_INFERIOR_NASAL_RESTRICTION: 0
OS_NORMAL: 1
OS_INFERIOR_NASAL_RESTRICTION: 0
OS_SUPERIOR_NASAL_RESTRICTION: 0
OD_SUPERIOR_NASAL_RESTRICTION: 0
OS_INFERIOR_TEMPORAL_RESTRICTION: 0
OS_SUPERIOR_TEMPORAL_RESTRICTION: 0
OD_SUPERIOR_TEMPORAL_RESTRICTION: 0

## 2024-04-02 ASSESSMENT — MARGIN REFLEX DISTANCE
OS_MRD1: 5
OD_MRD2: 8
OD_MRD1: 5
OS_MRD2: 8

## 2024-04-02 ASSESSMENT — EXTERNAL EXAM - RIGHT EYE: OD_EXAM: NORMAL

## 2024-04-02 NOTE — NURSING NOTE
Chief Complaint(s) and History of Present Illness(es)       Thyroid Disease              Laterality: both eyes    Associated symptoms: dryness, eye pain, tearing and photophobia.  Negative for double vision    Treatments tried: glasses    Comments: Eyes feel dry/irritated, appearance of eyes seems to be different day to day, +extreme pressure around eyes, eyes not always closing while sleeping               Comments    03/15/2024  TSH W/REFLEX TO FT4 1.35

## 2024-04-02 NOTE — PROGRESS NOTES
[facility]       HPI:   Diagnosed with hyperthyroidism in 2016, was initially treated with methimazole but it was not improving so she underwent total thyroidectomy in 2020. She has seen a general eye doctor since being diagnosed with thyroid problems. Does not have blurry vision in glasses from about a year ago. Has significant light sensitivity, eye watering, eye pain. She also has pressure/pain behind the eyes. She also has FBS and severe burning. Does not use eye drops. These symptoms have been progressively worsening. She also has bilateral eye bulging that improved since surgery (thyroidectomy).    Referring physician: Jane Pryor NP    Thyroid history:  Diagnosed when? 2016  VARGAS: no  Thyroidectomy: October 2020  Takes levothyroxine 112 mcg daily    TSI- 16.4 10/2019  TSH 1.35 3/15/24      Eye symptoms (since when): initial  Proptosis (better/worse/same since last visit): same  Diplopia(better/worse/same since last visit): no  Eyelid retraction(better/worse/same since last visit): same  Tearing(better/worse/same since last visit): worse  Redness (better/worse/same since last visit): worse  Pain (better/worse/same since last visit): worse  Pain to move the eyes (better/worse/same since last visit): same  Blurred vision: same, seldom    Ocular history:   Orbital decompression (date, details): no  Strabismus surgery (date, details): no  Eyelid surgery (date, details): no    Exam:   Stephanie (base): 100/27/27 (from 26/26)    Better/worse same: Same  Strabismus (better/worse/same): no  Eyelid retraction (better/worse/same): yes, same    JORGE L Score:  1. Spontaneous orbital pain.     1  2. Gaze evoked orbital pain.     0  3. Eyelid swelling due to active thyroid eye disease  0  4. Eyelid erythema.      0  5. Conjunctival redness due to active thyroid eye disease . 0  6. Chemosis.        0  7. Inflammation of caruncle OR plica.   0    Patients assessed after follow-up can be scored out of 10 by  including items  8-10.    8. Increase of > 2mm in proptosis.    0   9. Decrease in uniocular excursion in any direction of > 8 . 0  10. Decrease of acuity equivalent to 1 Snellen line.  0    JORGE L SCORE = 1/10    Ramos Diplopia Score = 0  0 = no diplopia  1 = intermittent (when tired, upon waking, end of day etc)  2 = inconstant (extreme gazes)  3 = constant    Chronic inactive thyroid eye disease   Bilateral proptosis    Symptoms started back in 2016. Today exam is stable including proptosis.  JORGE L 1/10.    Dr. Serna discussed the risks, benefits, and alternatives of orbital decompression today.     Daphne note:    Bilateral lateral orbital decompression.    Today with Lanie Manuel, I reviewed the indications, risks, benefits, and alternatives of the proposed surgical procedure including, but not limited to, failure obtain the desired result  and need for additional surgery, double vision, change in pupil size, scar, numbness, need for more decompression, bleeding, infection, loss of vision, loss of the eye, and the remote possibility of permanent damage to any organ system or death with the use of anesthesia.  I provided multiple opportunities for the questions, answered all questions to the best of my ability, and confirmed that my answers and my discussion were understood.          Clarke Falcon MD  Resident Physician, PGY-2  Department of Ophthalmology     Complete documentation of historical and exam elements from today's encounter can be found in the full encounter summary report (not reduplicated in this progress note).  I personally obtained the chief complaint(s) and history of present illness.  I confirmed and edited as necessary the review of systems, past medical/surgical history, family history, social history, and examination findings as documented by others; and I examined the patient myself.  I personally reviewed the relevant tests, images, and reports as documented above.  I formulated and edited as  necessary the assessment and plan and discussed the findings and management plan with the patient and family.  I personally reviewed the ophthalmic test(s) associated with this encounter, agree with the interpretation(s) as documented by the resident/fellow, and have edited the corresponding report(s) as necessary.     Ayden Ribeiro MD

## 2024-04-05 ENCOUNTER — TELEPHONE (OUTPATIENT)
Dept: OPHTHALMOLOGY | Facility: CLINIC | Age: 39
End: 2024-04-05
Payer: COMMERCIAL

## 2024-04-05 NOTE — TELEPHONE ENCOUNTER
Message left for the patient to call back to get surgery scheduled with Dr. Serna.   Lili Leong  Surgery Scheduling Coordinator  Ph: [phone]

## 2024-04-09 ENCOUNTER — HOSPITAL ENCOUNTER (OUTPATIENT)
Dept: CT IMAGING | Facility: HOSPITAL | Age: 39
Discharge: HOME | End: 2024-04-09
Attending: OPHTHALMOLOGY | Admitting: OPHTHALMOLOGY
Payer: COMMERCIAL

## 2024-04-09 DIAGNOSIS — E05.00 PROPTOSIS DUE TO THYROID DISORDER: ICD-10-CM

## 2024-04-09 DIAGNOSIS — E07.9 THYROID EYE DISEASE: ICD-10-CM

## 2024-04-09 DIAGNOSIS — H57.89 THYROID EYE DISEASE: ICD-10-CM

## 2024-04-09 PROCEDURE — 70480 CT ORBIT/EAR/FOSSA W/O DYE: CPT

## 2024-04-12 NOTE — TELEPHONE ENCOUNTER
2nd message left for the patient to call back to get surgery scheduled with Dr. Serna.   [facility]  Surgery Scheduling Coordinator  Ph: [phone]

## 2024-04-25 PROBLEM — H57.89 THYROID EYE DISEASE: Status: ACTIVE | Noted: 2024-04-02

## 2024-04-25 PROBLEM — E07.9 THYROID EYE DISEASE: Status: ACTIVE | Noted: 2024-04-02

## 2024-04-25 PROBLEM — E05.00 PROPTOSIS DUE TO THYROID DISORDER: Status: ACTIVE | Noted: 2024-04-02

## 2024-04-25 NOTE — TELEPHONE ENCOUNTER
Called patient to schedule surgery with Dr. Serna    Spoke with: Lanie    Date of Surgery: 7-25-24     Patient aware of approximate arrival time: No at Patient to get a call a couple of days prior to surgery.      Location of surgery: [facility]     Pre-Op H&P: [facility]     Informed patient that they need to call to schedule pre-op H&P with PCP within 30 days of surgery date: Yes    Post-Op Appt Dates: 8-6-24 11:00am [facility]     Discussed with patient pre-op RN will call 2-3 days prior to surgery with arrival time and instructions:  Yes    Discussed with patient PAC RN will provide arrival time and instructions for surgery at the time of the appointment: [[place] locations only]: Not Applicable      Standard Surgery Packet Sent: Yes 04/25/24  via Mail - Standard      Surgical Soap Discussed with Patient: Yes  - Received:  Local Pharmacy       Additional Information Sent in Packet:          Informed patient that they will need an adult  to bring patient home from surgery: Yes  : Patient isn't sure at this time who she will have for a , but is aware that she needs one in order to have surgery         Additional Comments:        All patients questions were answered and was instructed to review surgical packet and call back [phone] with any questions or concerns.       Marjan Leong on 4/25/2024 at 2:37 PM

## 2024-07-22 ENCOUNTER — TELEPHONE (OUTPATIENT)
Dept: OPHTHALMOLOGY | Facility: CLINIC | Age: 39
End: 2024-07-22
Payer: COMMERCIAL

## 2024-07-22 DIAGNOSIS — E07.9 THYROID EYE DISEASE: Primary | ICD-10-CM

## 2024-07-22 DIAGNOSIS — H57.89 THYROID EYE DISEASE: Primary | ICD-10-CM

## 2024-07-22 NOTE — TELEPHONE ENCOUNTER
Spoke with patient regarding rescheduling CT Scan again for improved imaging prior to surgery. Scheduled patient accordingly with Imaging department and patient was provided appointment details over the phone.-Per Patient

## 2024-07-24 ENCOUNTER — ANCILLARY PROCEDURE (OUTPATIENT)
Dept: CT IMAGING | Facility: CLINIC | Age: 39
End: 2024-07-24
Attending: OPHTHALMOLOGY
Payer: COMMERCIAL

## 2024-07-24 ENCOUNTER — ANESTHESIA EVENT (OUTPATIENT)
Dept: SURGERY | Facility: AMBULATORY SURGERY CENTER | Age: 39
End: 2024-07-24
Payer: COMMERCIAL

## 2024-07-24 DIAGNOSIS — H57.89 THYROID EYE DISEASE: ICD-10-CM

## 2024-07-24 DIAGNOSIS — E07.9 THYROID EYE DISEASE: ICD-10-CM

## 2024-07-24 PROCEDURE — 70480 CT ORBIT/EAR/FOSSA W/O DYE: CPT | Mod: GC | Performed by: RADIOLOGY

## 2024-07-25 ENCOUNTER — ANESTHESIA (OUTPATIENT)
Dept: SURGERY | Facility: AMBULATORY SURGERY CENTER | Age: 39
End: 2024-07-25
Payer: COMMERCIAL

## 2024-07-25 ENCOUNTER — HOSPITAL ENCOUNTER (OUTPATIENT)
Facility: AMBULATORY SURGERY CENTER | Age: 39
Discharge: HOME | End: 2024-07-25
Attending: OPHTHALMOLOGY
Payer: COMMERCIAL

## 2024-07-25 VITALS
SYSTOLIC BLOOD PRESSURE: 99 MMHG | WEIGHT: 193 LBS | DIASTOLIC BLOOD PRESSURE: 69 MMHG | HEART RATE: 71 BPM | TEMPERATURE: 97.2 F | OXYGEN SATURATION: 96 % | RESPIRATION RATE: 17 BRPM | BODY MASS INDEX: 32.95 KG/M2 | HEIGHT: 64 IN

## 2024-07-25 DIAGNOSIS — E07.9 THYROID EYE DISEASE: Primary | ICD-10-CM

## 2024-07-25 DIAGNOSIS — Z98.890 POSTOPERATIVE EYE STATE: ICD-10-CM

## 2024-07-25 DIAGNOSIS — H57.89 THYROID EYE DISEASE: Primary | ICD-10-CM

## 2024-07-25 LAB
HCG UR QL: NEGATIVE
INTERNAL QC OK POCT: NORMAL
POCT KIT EXPIRATION DATE: NORMAL
POCT KIT LOT NUMBER: NORMAL

## 2024-07-25 PROCEDURE — 61782 SCAN PROC CRANIAL EXTRA: CPT | Mod: GC | Performed by: OPHTHALMOLOGY

## 2024-07-25 PROCEDURE — 67414 EXPLR/DECOMPRESS EYE SOCKET: CPT | Mod: LT

## 2024-07-25 PROCEDURE — 67414 EXPLR/DECOMPRESS EYE SOCKET: CPT | Performed by: ANESTHESIOLOGY

## 2024-07-25 PROCEDURE — 67414 EXPLR/DECOMPRESS EYE SOCKET: CPT | Performed by: NURSE ANESTHETIST, CERTIFIED REGISTERED

## 2024-07-25 PROCEDURE — 81025 URINE PREGNANCY TEST: CPT | Performed by: PATHOLOGY

## 2024-07-25 PROCEDURE — 67414 EXPLR/DECOMPRESS EYE SOCKET: CPT | Mod: 50 | Performed by: OPHTHALMOLOGY

## 2024-07-25 RX ORDER — ERYTHROMYCIN 5 MG/G
OINTMENT OPHTHALMIC
Qty: 3.5 G | Refills: 1 | Status: SHIPPED | OUTPATIENT
Start: 2024-07-25 | End: 2024-08-20

## 2024-07-25 RX ORDER — HYDROXYZINE HYDROCHLORIDE 25 MG/1
25 TABLET, FILM COATED ORAL EVERY 6 HOURS PRN
Status: DISCONTINUED | OUTPATIENT
Start: 2024-07-25 | End: 2024-07-26 | Stop reason: HOSPADM

## 2024-07-25 RX ORDER — LIDOCAINE 40 MG/G
CREAM TOPICAL
Status: DISCONTINUED | OUTPATIENT
Start: 2024-07-25 | End: 2024-07-26 | Stop reason: HOSPADM

## 2024-07-25 RX ORDER — LIDOCAINE HYDROCHLORIDE 20 MG/ML
INJECTION, SOLUTION INFILTRATION; PERINEURAL PRN
Status: DISCONTINUED | OUTPATIENT
Start: 2024-07-25 | End: 2024-07-25

## 2024-07-25 RX ORDER — PROPOFOL 10 MG/ML
INJECTION, EMULSION INTRAVENOUS PRN
Status: DISCONTINUED | OUTPATIENT
Start: 2024-07-25 | End: 2024-07-25

## 2024-07-25 RX ORDER — DEXAMETHASONE SODIUM PHOSPHATE 10 MG/ML
4 INJECTION, SOLUTION INTRAMUSCULAR; INTRAVENOUS
Status: DISCONTINUED | OUTPATIENT
Start: 2024-07-25 | End: 2024-07-26 | Stop reason: HOSPADM

## 2024-07-25 RX ORDER — FENTANYL CITRATE 50 UG/ML
INJECTION, SOLUTION INTRAMUSCULAR; INTRAVENOUS PRN
Status: DISCONTINUED | OUTPATIENT
Start: 2024-07-25 | End: 2024-07-25

## 2024-07-25 RX ORDER — ONDANSETRON 4 MG/1
4 TABLET, ORALLY DISINTEGRATING ORAL EVERY 30 MIN PRN
Status: DISCONTINUED | OUTPATIENT
Start: 2024-07-25 | End: 2024-07-26 | Stop reason: HOSPADM

## 2024-07-25 RX ORDER — GLYCOPYRROLATE 0.2 MG/ML
INJECTION, SOLUTION INTRAMUSCULAR; INTRAVENOUS PRN
Status: DISCONTINUED | OUTPATIENT
Start: 2024-07-25 | End: 2024-07-25

## 2024-07-25 RX ORDER — SODIUM CHLORIDE, SODIUM LACTATE, POTASSIUM CHLORIDE, CALCIUM CHLORIDE 600; 310; 30; 20 MG/100ML; MG/100ML; MG/100ML; MG/100ML
INJECTION, SOLUTION INTRAVENOUS CONTINUOUS
Status: DISCONTINUED | OUTPATIENT
Start: 2024-07-25 | End: 2024-07-26 | Stop reason: HOSPADM

## 2024-07-25 RX ORDER — NALOXONE HYDROCHLORIDE 0.4 MG/ML
0.1 INJECTION, SOLUTION INTRAMUSCULAR; INTRAVENOUS; SUBCUTANEOUS
Status: DISCONTINUED | OUTPATIENT
Start: 2024-07-25 | End: 2024-07-26 | Stop reason: HOSPADM

## 2024-07-25 RX ORDER — ACETAMINOPHEN 325 MG/1
975 TABLET ORAL ONCE
Status: COMPLETED | OUTPATIENT
Start: 2024-07-25 | End: 2024-07-25

## 2024-07-25 RX ORDER — NEOMYCIN POLYMYXIN B SULFATES AND DEXAMETHASONE 3.5; 10000; 1 MG/ML; [USP'U]/ML; MG/ML
SUSPENSION/ DROPS OPHTHALMIC
Qty: 5 ML | Refills: 0 | Status: SHIPPED | OUTPATIENT
Start: 2024-07-25 | End: 2024-08-20

## 2024-07-25 RX ORDER — FENTANYL CITRATE 50 UG/ML
25 INJECTION, SOLUTION INTRAMUSCULAR; INTRAVENOUS
Status: DISCONTINUED | OUTPATIENT
Start: 2024-07-25 | End: 2024-07-26 | Stop reason: HOSPADM

## 2024-07-25 RX ORDER — HYDROMORPHONE HYDROCHLORIDE 1 MG/ML
0.4 INJECTION, SOLUTION INTRAMUSCULAR; INTRAVENOUS; SUBCUTANEOUS EVERY 5 MIN PRN
Status: DISCONTINUED | OUTPATIENT
Start: 2024-07-25 | End: 2024-07-26 | Stop reason: HOSPADM

## 2024-07-25 RX ORDER — HYDROMORPHONE HYDROCHLORIDE 1 MG/ML
0.2 INJECTION, SOLUTION INTRAMUSCULAR; INTRAVENOUS; SUBCUTANEOUS EVERY 5 MIN PRN
Status: DISCONTINUED | OUTPATIENT
Start: 2024-07-25 | End: 2024-07-26 | Stop reason: HOSPADM

## 2024-07-25 RX ORDER — PROPOFOL 10 MG/ML
INJECTION, EMULSION INTRAVENOUS CONTINUOUS PRN
Status: DISCONTINUED | OUTPATIENT
Start: 2024-07-25 | End: 2024-07-25

## 2024-07-25 RX ORDER — KETOROLAC TROMETHAMINE 30 MG/ML
15 INJECTION, SOLUTION INTRAMUSCULAR; INTRAVENOUS
Status: DISCONTINUED | OUTPATIENT
Start: 2024-07-25 | End: 2024-07-26 | Stop reason: HOSPADM

## 2024-07-25 RX ORDER — FENTANYL CITRATE 50 UG/ML
25 INJECTION, SOLUTION INTRAMUSCULAR; INTRAVENOUS EVERY 5 MIN PRN
Status: DISCONTINUED | OUTPATIENT
Start: 2024-07-25 | End: 2024-07-26 | Stop reason: HOSPADM

## 2024-07-25 RX ORDER — SODIUM CHLORIDE, SODIUM LACTATE, POTASSIUM CHLORIDE, CALCIUM CHLORIDE 600; 310; 30; 20 MG/100ML; MG/100ML; MG/100ML; MG/100ML
INJECTION, SOLUTION INTRAVENOUS CONTINUOUS PRN
Status: DISCONTINUED | OUTPATIENT
Start: 2024-07-25 | End: 2024-07-25

## 2024-07-25 RX ORDER — OXYCODONE HYDROCHLORIDE 5 MG/1
5 TABLET ORAL EVERY 6 HOURS PRN
Qty: 12 TABLET | Refills: 0 | Status: SHIPPED | OUTPATIENT
Start: 2024-07-25 | End: 2024-07-28

## 2024-07-25 RX ORDER — FENTANYL CITRATE 50 UG/ML
50 INJECTION, SOLUTION INTRAMUSCULAR; INTRAVENOUS EVERY 5 MIN PRN
Status: DISCONTINUED | OUTPATIENT
Start: 2024-07-25 | End: 2024-07-26 | Stop reason: HOSPADM

## 2024-07-25 RX ORDER — TRIAMCINOLONE ACETONIDE 40 MG/ML
INJECTION, SUSPENSION INTRA-ARTICULAR; INTRAMUSCULAR PRN
Status: DISCONTINUED | OUTPATIENT
Start: 2024-07-25 | End: 2024-07-25 | Stop reason: HOSPADM

## 2024-07-25 RX ORDER — ONDANSETRON 2 MG/ML
INJECTION INTRAMUSCULAR; INTRAVENOUS PRN
Status: DISCONTINUED | OUTPATIENT
Start: 2024-07-25 | End: 2024-07-25

## 2024-07-25 RX ORDER — ONDANSETRON 2 MG/ML
4 INJECTION INTRAMUSCULAR; INTRAVENOUS EVERY 30 MIN PRN
Status: DISCONTINUED | OUTPATIENT
Start: 2024-07-25 | End: 2024-07-26 | Stop reason: HOSPADM

## 2024-07-25 RX ORDER — KETAMINE HYDROCHLORIDE 10 MG/ML
INJECTION INTRAMUSCULAR; INTRAVENOUS PRN
Status: DISCONTINUED | OUTPATIENT
Start: 2024-07-25 | End: 2024-07-25

## 2024-07-25 RX ORDER — OXYCODONE HYDROCHLORIDE 5 MG/1
5 TABLET ORAL
Status: COMPLETED | OUTPATIENT
Start: 2024-07-25 | End: 2024-07-25

## 2024-07-25 RX ORDER — DEXAMETHASONE SODIUM PHOSPHATE 4 MG/ML
INJECTION, SOLUTION INTRA-ARTICULAR; INTRALESIONAL; INTRAMUSCULAR; INTRAVENOUS; SOFT TISSUE PRN
Status: DISCONTINUED | OUTPATIENT
Start: 2024-07-25 | End: 2024-07-25

## 2024-07-25 RX ORDER — ALBUTEROL SULFATE 0.83 MG/ML
2.5 SOLUTION RESPIRATORY (INHALATION) EVERY 4 HOURS PRN
Status: DISCONTINUED | OUTPATIENT
Start: 2024-07-25 | End: 2024-07-26 | Stop reason: HOSPADM

## 2024-07-25 RX ORDER — MEPERIDINE HYDROCHLORIDE 25 MG/ML
12.5 INJECTION INTRAMUSCULAR; INTRAVENOUS; SUBCUTANEOUS EVERY 5 MIN PRN
Status: DISCONTINUED | OUTPATIENT
Start: 2024-07-25 | End: 2024-07-26 | Stop reason: HOSPADM

## 2024-07-25 RX ORDER — LABETALOL HYDROCHLORIDE 5 MG/ML
10 INJECTION, SOLUTION INTRAVENOUS
Status: DISCONTINUED | OUTPATIENT
Start: 2024-07-25 | End: 2024-07-26 | Stop reason: HOSPADM

## 2024-07-25 RX ORDER — DIAZEPAM 10 MG/2ML
2.5 INJECTION, SOLUTION INTRAMUSCULAR; INTRAVENOUS
Status: DISCONTINUED | OUTPATIENT
Start: 2024-07-25 | End: 2024-07-26 | Stop reason: HOSPADM

## 2024-07-25 RX ORDER — OXYCODONE HYDROCHLORIDE 5 MG/1
10 TABLET ORAL
Status: COMPLETED | OUTPATIENT
Start: 2024-07-25 | End: 2024-07-25

## 2024-07-25 RX ORDER — ERYTHROMYCIN 5 MG/G
OINTMENT OPHTHALMIC PRN
Status: DISCONTINUED | OUTPATIENT
Start: 2024-07-25 | End: 2024-07-25 | Stop reason: HOSPADM

## 2024-07-25 RX ADMIN — HYDROMORPHONE HYDROCHLORIDE 0.3 MG: 1 INJECTION, SOLUTION INTRAMUSCULAR; INTRAVENOUS; SUBCUTANEOUS at 16:27

## 2024-07-25 RX ADMIN — SODIUM CHLORIDE, SODIUM LACTATE, POTASSIUM CHLORIDE, CALCIUM CHLORIDE: 600; 310; 30; 20 INJECTION, SOLUTION INTRAVENOUS at 14:04

## 2024-07-25 RX ADMIN — OXYCODONE HYDROCHLORIDE 5 MG: 5 TABLET ORAL at 16:32

## 2024-07-25 RX ADMIN — Medication 100 MCG: at 14:19

## 2024-07-25 RX ADMIN — Medication 0.5 MG: at 14:18

## 2024-07-25 RX ADMIN — PROPOFOL 150 MCG/KG/MIN: 10 INJECTION, EMULSION INTRAVENOUS at 14:09

## 2024-07-25 RX ADMIN — FENTANYL CITRATE 50 MCG: 50 INJECTION, SOLUTION INTRAMUSCULAR; INTRAVENOUS at 14:09

## 2024-07-25 RX ADMIN — Medication 100 MCG: at 14:10

## 2024-07-25 RX ADMIN — SODIUM CHLORIDE, SODIUM LACTATE, POTASSIUM CHLORIDE, CALCIUM CHLORIDE: 600; 310; 30; 20 INJECTION, SOLUTION INTRAVENOUS at 14:51

## 2024-07-25 RX ADMIN — DEXAMETHASONE SODIUM PHOSPHATE 10 MG: 4 INJECTION, SOLUTION INTRA-ARTICULAR; INTRALESIONAL; INTRAMUSCULAR; INTRAVENOUS; SOFT TISSUE at 14:12

## 2024-07-25 RX ADMIN — Medication 100 MCG: at 14:22

## 2024-07-25 RX ADMIN — FENTANYL CITRATE 50 MCG: 50 INJECTION, SOLUTION INTRAMUSCULAR; INTRAVENOUS at 14:27

## 2024-07-25 RX ADMIN — PROPOFOL 150 MCG/KG/MIN: 10 INJECTION, EMULSION INTRAVENOUS at 15:07

## 2024-07-25 RX ADMIN — FENTANYL CITRATE 50 MCG: 50 INJECTION, SOLUTION INTRAMUSCULAR; INTRAVENOUS at 14:30

## 2024-07-25 RX ADMIN — PROPOFOL 150 MG: 10 INJECTION, EMULSION INTRAVENOUS at 14:09

## 2024-07-25 RX ADMIN — FENTANYL CITRATE 25 MCG: 50 INJECTION, SOLUTION INTRAMUSCULAR; INTRAVENOUS at 16:04

## 2024-07-25 RX ADMIN — OXYCODONE HYDROCHLORIDE 5 MG: 5 TABLET ORAL at 17:06

## 2024-07-25 RX ADMIN — Medication 100 MCG: at 14:13

## 2024-07-25 RX ADMIN — ONDANSETRON 4 MG: 2 INJECTION INTRAMUSCULAR; INTRAVENOUS at 14:12

## 2024-07-25 RX ADMIN — PROPOFOL 150 MCG/KG/MIN: 10 INJECTION, EMULSION INTRAVENOUS at 14:43

## 2024-07-25 RX ADMIN — FENTANYL CITRATE 25 MCG: 50 INJECTION, SOLUTION INTRAMUSCULAR; INTRAVENOUS at 15:57

## 2024-07-25 RX ADMIN — HYDROMORPHONE HYDROCHLORIDE 0.2 MG: 1 INJECTION, SOLUTION INTRAMUSCULAR; INTRAVENOUS; SUBCUTANEOUS at 16:22

## 2024-07-25 RX ADMIN — KETAMINE HYDROCHLORIDE 10 MG: 10 INJECTION INTRAMUSCULAR; INTRAVENOUS at 14:32

## 2024-07-25 RX ADMIN — FENTANYL CITRATE 50 MCG: 50 INJECTION, SOLUTION INTRAMUSCULAR; INTRAVENOUS at 16:14

## 2024-07-25 RX ADMIN — ACETAMINOPHEN 975 MG: 325 TABLET ORAL at 12:05

## 2024-07-25 RX ADMIN — LIDOCAINE HYDROCHLORIDE 100 MG: 20 INJECTION, SOLUTION INFILTRATION; PERINEURAL at 14:09

## 2024-07-25 RX ADMIN — GLYCOPYRROLATE 0.2 MG: 0.2 INJECTION, SOLUTION INTRAMUSCULAR; INTRAVENOUS at 14:13

## 2024-07-25 NOTE — ANESTHESIA PROCEDURE NOTES
Airway       Patient location during procedure: OR  Staff -        Performed By: SRNA  Consent for Airway        Urgency: elective  Indications and Patient Condition       Indications for airway management: luz-procedural       Induction type:intravenous       Mask difficulty assessment: 0 - not attempted    Final Airway Details       Final airway type: supraglottic airway    Supraglottic Airway Details        Type: LMA       Brand: LMA Unique       LMA size: 4    Post intubation assessment        Placement verified by: capnometry, equal breath sounds and chest rise        Number of attempts at approach: 1       Number of other approaches attempted: 0       Secured with: tape       Ease of procedure: easy       Dentition: Intact and Unchanged

## 2024-07-25 NOTE — OP NOTE
PREOPERATIVE DIAGNOSIS: Thyroid eye disease with exophthalmos, bilateral eye.   POSTOPERATIVE DIAGNOSIS: Thyroid eye disease with exophthalmos, bilateral eye.   PROCEDURE:Bilateral lateral wall bone and fat orbital decompression with intraoperative navigation.   SURGEON: Wiliam Serna MD   ASSISTANTS: Patti Perry MD and Maurilio Banks MD  ANESTHESIA: General with local infiltration of a 50/50 mixture of 2% lidocaine with epinephrine and 0.5% Marcaine.   COMPLICATIONS: None.   ESTIMATED BLOOD LOSS: Less than 20 mL.   SPECIMENS: None.   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 After the risks, benefits and alternatives to the proposed procedure were explained, informed consent was obtained.   DESCRIPTION OF PROCEDURE: Lanie Manuel  was brought to the operating room and placed supine on the operating table. General anesthesia was induced.  The bilateral lateral canthal areas were infiltrated with local anesthetic and she was prepped and draped in the typical sterile ophthalmic fashion. Her preoperative CT scan had been uploaded to the navigation system. The facemask was applied. Registration was performed and accuracy was verified. Attention was directed to the right side. Lateral canthal incision was made with a 15 blade and dissection carried down through the orbicularis with monopolar cautery. Lateral canthotomy and superior and inferior cantholysis was performed. Dissection was carried along the orbital rim. A malleable retractor was used to protect the globe and the intraorbital contents and the periosteum was incised at the arcus marginalis. Periorbita was elevated from the lateral orbital wall. Hemostasis was obtained with monopolar cautery and bone wax. The lateral orbital wall bone was then removed utilizing the SonTheTakes ultrasonic bone aspirator. The bone removal started just inside the lateral orbital  rim and extended back to the thick bone of the trigone. The bone removal was then extended superiorly to the lacrimal fossa and inferiorly to the infraorbital fissure. Hemostasis was obtained with monopolar cautery and bone wax throughout this procedure. The inferotemporal periorbita was widely opened and the intraconal fat removed using monopolar cautery. 1mL of Kenalog 40 was infiltrated through a flexible angiocath into the orbit. Again, hemostasis was obtained. The superior and inferior elena of lateral canthal tendon were secured to the lateral orbital rim periosteum using a 5-0 Monocryl suture. The deep tissues were closed with interrupted buried 5-0 Monocryl suture. The skin was closed with running 6-0 plain gut suture. Ophthalmic antibiotic ointment was applied to the incision and into the eye. Attention was directed to the opposite side and the same procedure performed. The patient tolerated the procedure well. Lanie Manuel  left the operating room in stable condition after being awoken from the general anesthetic.   Wiliam Serna MD

## 2024-07-25 NOTE — BRIEF OP NOTE
[facility] [place]    Brief Operative Note    Pre-operative diagnosis: Thyroid eye disease [H57.89, E07.9]  Proptosis due to thyroid disorder [E05.00]  Post-operative diagnosis Same as pre-operative diagnosis    Procedure: Bilateral lateral orbital decompression with intraoperative navigation, Bilateral - Eye  Sonopet, N/A - Eye    Surgeon: Surgeons and Role:     * Wiliam Serna MD - Primary     * Maurilio Banks MD - Resident - Assisting     * Patti Perry MD - Fellow - Assisting  Anesthesia: General   Estimated Blood Loss: Minimal    Drains: None  Specimens: * No specimens in log *  Findings:   None.  Complications: None.  Implants: * No implants in log *

## 2024-07-25 NOTE — OR NURSING
Dr. Witt consulted regarding unrelenting 7/10 pain despite prn medications and ice/elevation. After discussion with patient, decision was made to discharge to home with plan to go to ER if pain remains uncontrolled at home. Dsuvia, oxycodone, fentanyl, dilaudid all administered in pacu.

## 2024-07-25 NOTE — ANESTHESIA PREPROCEDURE EVALUATION
Anesthesia Pre-Procedure Evaluation    Patient: Lanie Manuel   MRN: [id number] : 1985        Procedure : Procedure(s):  Bilateral lateral orbital decompression with intraoperative navigation  Sonopet          Past Medical History:   Diagnosis Date    Graves disease 2016      Past Surgical History:   Procedure Laterality Date     SECTION      THYROIDECTOMY         No Known Allergies   Social History     Tobacco Use    Smoking status: Never    Smokeless tobacco: Never   Substance Use Topics    Alcohol use: No      Wt Readings from Last 1 Encounters:   24 87.5 kg (193 lb)        Anesthesia Evaluation            ROS/MED HX  ENT/Pulmonary:       Neurologic:       Cardiovascular:       METS/Exercise Tolerance:     Hematologic: Comments: Sickle Cell Trait      Musculoskeletal:       GI/Hepatic:       Renal/Genitourinary:     (+) renal disease,             Endo: Comment: Graves disease: post treatment    (+)          thyroid problem, hypothyroidism,    Obesity,       Psychiatric/Substance Use:       Infectious Disease:       Malignancy:       Other:            Physical Exam    Airway  airway exam normal      Mallampati: II   TM distance: > 3 FB   Neck ROM: full   Mouth opening: > 3 cm    Respiratory Devices and Support         Dental       (+) Completely normal teeth      Cardiovascular          Rhythm and rate: regular and normal     Pulmonary   pulmonary exam normal        breath sounds clear to auscultation           OUTSIDE LABS:  CBC:   Lab Results   Component Value Date    WBC 7.6 2023    WBC 7.9 2023    HGB 10.3 (L) 2023    HGB 10.5 (L) 2023    HCT 31.0 (L) 2023    HCT 32.7 (L) 2023     (H) 2023     (H) 2023     BMP:   Lab Results   Component Value Date     2023     2023    POTASSIUM 4.0 2023    POTASSIUM 3.9 2023    CHLORIDE 102 2023    CHLORIDE 105 2023    CO2 26 2023  "   CO2 29 11/26/2023    BUN 7.3 11/27/2023    BUN 4.2 (L) 11/26/2023    CR 0.63 11/27/2023    CR 0.70 11/26/2023    GLC 93 11/27/2023    GLC 94 11/26/2023     COAGS: No results found for: \"PTT\", \"INR\", \"FIBR\"  POC:   Lab Results   Component Value Date    HCG Negative 07/25/2024    HCGS Negative 11/21/2023     HEPATIC:   Lab Results   Component Value Date    ALBUMIN 3.2 (L) 11/22/2023    PROTTOTAL 6.6 11/22/2023    ALT 25 11/22/2023    AST 25 11/22/2023    ALKPHOS 136 11/22/2023    BILITOTAL 0.4 11/22/2023     OTHER:   Lab Results   Component Value Date    LACT 1.1 11/21/2023    A1C 5.4 11/24/2023    LOGAN 7.7 (L) 11/27/2023    MAG 1.9 11/27/2023    LIPASE 9 (L) 11/21/2023    TSH 1.23 11/21/2023       Anesthesia Plan    ASA Status:  2    NPO Status:  NPO Appropriate    Anesthesia Type: General.     - Airway: LMA   Induction: Intravenous.   Maintenance: Balanced.        Consents    Anesthesia Plan(s) and associated risks, benefits, and realistic alternatives discussed. Questions answered and patient/representative(s) expressed understanding.     - Discussed:     - Discussed with:  Patient      - Extended Intubation/Ventilatory Support Discussed: No.      - Patient is DNR/DNI Status: No     Use of blood products discussed: No .     Postoperative Care    Pain management: IV analgesics, Oral pain medications, Multi-modal analgesia.   PONV prophylaxis: Ondansetron (or other 5HT-3), Background Propofol Infusion     Comments:               Fredrick Bauer MD    I have reviewed the pertinent notes and labs in the chart from the past 30 days and (re)examined the patient.  Any updates or changes from those notes are reflected in this note.              # Obesity: Estimated body mass index is 33.13 kg/m  as calculated from the following:    Height as of this encounter: 1.626 m (5' 4\").    Weight as of this encounter: 87.5 kg (193 lb).      "

## 2024-07-25 NOTE — DISCHARGE INSTRUCTIONS
Post-operative Instructions  Ophthalmic Plastic and Reconstructive Surgery    Wiliam Serna M.D.     All instructions apply to the operated eye(s) or eyelid(s).    Wound care and personal care  ? Apply ice compresses and gentle pressure 15 minutes on, 15 minutes off, for 2 days. If you are sleeping, you don't need to wake up to ice. As long as there is no further bleeding, after two days, switch to warm water compresses for five minutes, four times a day until seen by your physician.   ? You may shower or wash your hair the day after surgery. Do not go swimming for at least 2 weeks to prevent contamination of your wounds.  ? You may go for walks and light activity is ok, but no heavy (over 15 pounds) lifting, bending or excessive straining for one week.   ? Do not apply make-up to the eyes or eyelids for 2 weeks after surgery.  ? Expect some swelling, bruising, black eye (even into the lower eyelids and cheeks). Also expect serum caking, crusting and discharge from the eye and/or incisions. A small amount of surface bleeding, and depending on the type of surgery, bleeding from the inside of the eyelid, is normal for the first 48 hours.  ? Avoid straining, bending at the waist, or lifting more than 15 pounds for 1 week. Sleeping with your head elevated, such as in a recliner, for the first several days can help swelling resolve more quickly.   ? Do continue to ambulate (walk) as you normally would - being sedentary after surgery can cause blood clots.   ? Your eye(s) and eyelid(s) may be painful and tender. This is normal after surgery.      Contact information and follow-up  ? Return to the Eye Clinic for a follow-up appointment with your physician as scheduled. If no appointment has been scheduled:   - [facility] eye clinic: [phone] for an appointment with Dr. Serna within 2 to 3 weeks from your date of surgery.    After hours or on weekends and holidays, call [phone] and ask to  speak with the ophthalmologist on call.    An on call person can be reached after hours for concerns. The on call doctor should not call in medication refill requests after hours or on weekends, so please plan accordingly. An effort has been made to provide adequate pain medications following every surgery, and refills will not be provided in most instances.     Medications  ? Restart all regular home medications and eye drops. If you take Plavix or Aspirin on a regular basis, wait for 72 hours after your surgery before restarting these in order to decrease the risk of bleeding complications.  ? Avoid aspirin and aspirin-like medications (Motrin, Aleve, Ibuprofen, [place] etc) for 72 hours to reduce the risk of bleeding. You may take Tylenol (acetaminophen) for pain.  ? In addition to your home medications, take the following post-operative medications as prescribed by your physician.    ? Apply antibiotic ointment to all sutures three times a day, and into the operated eye(s) at night.   Once you run out, you can apply Vaseline or Aquaphor (over the counter) to the incisions. Don't put the Vaseline or Aquaphor into your eyes.   ? Instill prescribed eye drops 3 times a day for 10 days.   ? If you have ocular irritation, you can use over the counter artificial tears such as Refresh, Systane, or Blink. Do not use Visine, Clear Eyes, or any other drop that gets the red out.   ? In many cases, postoperative discomfort can be managed with Tylenol alone. If narcotic pain medication was prescribed, take pain pills at prescribed frequency as needed for pain.    WARNING:   ? Prescribed narcotic medications may make you drowsy. You must not drive a car, operate heavy machinery or drink alcohol while taking them.  ? Prescribed narcotic pain medications may cause constipation and nausea.       [facility]  Home Care Following Anesthesia  For 24 hours after surgery:  Get plenty of rest.   A responsible adult must stay with you for at least 24 hours after you leave the surgery center.  Do not drive or use heavy equipment.  If you have weakness or tingling, don't drive or use heavy equipment until this feeling goes away.   Do not drink alcohol.   Avoid strenuous or risky activities.  Ask for help when climbing stairs.  You may feel lightheaded.  IF so, sit for a few minutes before standing.  Have someone help you get up.   If you have nausea (feel sick to your stomach): Drink only clear liquids such as apple juice, ginger ale, broth or 7-Up.  Rest may also help.  Be sure to drink enough fluids.  Move to a regular diet as you feel able.   You may have a slight fever.  Call the doctor if your fever is over 100 F (37.7 C) (taken under the tongue) or lasts longer than 24 hours.  You may have a dry mouth, a sore throat, muscle aches or trouble sleeping. These should go away after 24 hours.  Do not make important or legal decisions.   It is recommended to avoid smoking.               Tips for taking pain medications  To get the best pain relief possible, remember these points:  Take pain medications as directed, before pain becomes severe.  Pain medication can upset your stomach: taking it with food may help.  Constipation is a common side effect of pain medication. Drink plenty of  fluids.  Eat foods high in fiber. Take a stool softener if recommended by your doctor or pharmacist.  Do not drink alcohol, drive or operate machinery while taking pain medications.  Ask about other ways to control pain, such as with heat, ice or relaxation.    Tylenol/Acetaminophen Consumption    If you feel your pain relief is insufficient, you may take Tylenol/Acetaminophen in addition to your narcotic pain medication.   Be careful not to exceed 4,000 mg of Tylenol/Acetaminophen in a 24 hour period from all sources.  If you are taking extra strength Tylenol/acetaminophen (500 mg), the maximum dose is 8 tablets in 24 hours.  If  you are taking regular strength acetaminophen (325 mg), the maximum dose is 12 tablets in 24 hours.  Tylenol 975 mg given at 12 PM.   Ok to take more after 6 PM.       Call a doctor for any of the following:  Signs of infection (fever, growing tenderness at the surgery site, a large amount of drainage or bleeding, severe pain, foul-smelling drainage, redness, swelling).  It has been over 8 to 10 hours since surgery and you are still not able to urinate (pass water).  Headache for over 24 hours.  Numbness, tingling or weakness the day after surgery (if you had spinal anesthesia).  Signs of Covid-19 infection (temperature over 100 degrees, shortness of breath, cough, loss of taste/smell, generalized body aches, persistent headache, chills, sore throat, nausea/vomiting/diarrhea)  Your doctor is:       Dr. Wiliam Serna, Ophthalmology: [phone]               Or dial [phone] and ask for the resident on call for:  Ophthalmology  For emergency care, call the:  [facility]:  [phone] (TTY for hearing impaired: [phone])

## 2024-07-25 NOTE — ANESTHESIA POSTPROCEDURE EVALUATION
Patient: Lanie Manuel    Procedure: Procedure(s):  Bilateral lateral orbital decompression with intraoperative navigation  Sonopet       Anesthesia Type:  General    Note:  Disposition: Outpatient   Postop Pain Control: Uneventful            Sign Out: Well controlled pain   PONV: No   Neuro/Psych: Uneventful            Sign Out: Acceptable/Baseline neuro status   Airway/Respiratory: Uneventful            Sign Out: Acceptable/Baseline resp. status   CV/Hemodynamics: Uneventful            Sign Out: Acceptable CV status; No obvious hypovolemia; No obvious fluid overload   Other NRE: NONE   DID A NON-ROUTINE EVENT OCCUR?            Last vitals:  Vitals Value Taken Time   /75 07/25/24 1645   Temp 36.2  C (97.1  F) 07/25/24 1645   Pulse 70 07/25/24 1645   Resp 9 07/25/24 1645   SpO2 100 % 07/25/24 1645   Vitals shown include unfiled device data.    Electronically Signed By: Martinez Witt MD  July 25, 2024  5:24 PM

## 2024-08-07 ENCOUNTER — TELEPHONE (OUTPATIENT)
Dept: OPHTHALMOLOGY | Facility: CLINIC | Age: 39
End: 2024-08-07
Payer: COMMERCIAL

## 2024-08-07 NOTE — TELEPHONE ENCOUNTER
Spoke with patient regarding rescheduling missed POST-OP. Offered patient a [facility] same day appointment on 8/7/24. Patient declined 8/7/24 option and rescheduled for an other date. Sent reminder letter and map to confirmed address.-Per Patient        Patient stated she called primary [facility] number of [phone] yesterday to reschedule and LVM and no one ever called patient back.

## 2024-08-20 ENCOUNTER — OFFICE VISIT (OUTPATIENT)
Dept: OPHTHALMOLOGY | Facility: CLINIC | Age: 39
End: 2024-08-20
Payer: COMMERCIAL

## 2024-08-20 DIAGNOSIS — E05.00 GRAVES DISEASE: ICD-10-CM

## 2024-08-20 DIAGNOSIS — E05.00 PROPTOSIS DUE TO THYROID DISORDER: ICD-10-CM

## 2024-08-20 DIAGNOSIS — E07.9 THYROID EYE DISEASE: Primary | ICD-10-CM

## 2024-08-20 DIAGNOSIS — H57.89 THYROID EYE DISEASE: Primary | ICD-10-CM

## 2024-08-20 PROCEDURE — 99024 POSTOP FOLLOW-UP VISIT: CPT | Mod: GC | Performed by: OPHTHALMOLOGY

## 2024-08-20 ASSESSMENT — EXTERNAL EXAM - RIGHT EYE: OD_EXAM: NORMAL

## 2024-08-20 ASSESSMENT — MARGIN REFLEX DISTANCE
OD_MRD1: 4
OD_MRD2: 6
OS_MRD1: 4
OS_MRD2: 6

## 2024-08-20 ASSESSMENT — VISUAL ACUITY
OS_SC: 20/40
OS_SC+: -2
OD_SC: 20/25
OS_PH_SC+: -1
METHOD: SNELLEN - LINEAR
OS_PH_SC: 20/25
OD_PH_SC+: +1
OD_PH_SC: 20/25

## 2024-08-20 ASSESSMENT — TONOMETRY
OS_IOP_MMHG: 19
IOP_METHOD: ICARE
OD_IOP_MMHG: 18

## 2024-08-20 ASSESSMENT — LAGOPHTHALMOS
OS_LAGOPHTHALMOS: 0
OD_LAGOPHTHALMOS: 1

## 2024-08-20 ASSESSMENT — EXTERNAL EXAM - LEFT EYE: OS_EXAM: NORMAL

## 2024-08-20 NOTE — NURSING NOTE
Chief Complaints and History of Present Illnesses   Patient presents with    Post Op (Ophthalmology) Both Eyes     Chief Complaint(s) and History of Present Illness(es)       Post Op (Ophthalmology) Both Eyes              Laterality: both eyes    Onset: 1 month ago              Comments    POM#1 bilateral lateral wall bone and fat orbital decompression.  Intermittently blurry in both eyes since surgery.  Denies pain but some numbness laterally in both eyes.  Denies swelling.      Lou Vargas on 8/20/2024 at 10:51 AM

## 2024-08-20 NOTE — PROGRESS NOTES
"Chief Complaint(s) and History of Present Illness(es)     Post Op (Ophthalmology) Both Eyes            Laterality: both eyes    Onset: 1 month ago          Comments    POM#1 bilateral lateral wall bone and fat orbital decompression.    Intermittently blurry in both eyes since surgery.  Denies pain but some   numbness laterally in both eyes.  Denies swelling.      Lou Vargas on 8/20/2024 at 10:51 AM     Doing well. Happy with outcome.  Significant improvement in proptosis following bilateral decompression!  Mild LL retraction still present, will evaluate eyelid position once swelling and inflammation has fully resolved to determine if further surgery is necessary.    Patient Instructions   - Apply warm compresses for 1 minute two to three times a day until your bruising has resolved. You can continue this for one more month.   - Cool compresses can help with swelling and itching, you can alternate cool compresses with warm compresses if you find it helpful.  - Apply Aquaphor or Vaseline to the incision at bedtime until it is smooth.    - If you have symptoms of eye irritation, it is good to use over the counter artificial tears. Good brands include Refresh, Blink, and Systane. Do NOT get drops that are for \"red eyes.\"   - It is normal for the incision to appear raised, red, itch and have small lumps. You can gently massage any small bumps along the incision line. These can take up to six months to resolve.    Return in about 2 months (around 10/20/2024) for Follow Up.  Patti Perry MD  Agree with above, healing very nicely. Notices right side to be a bit more exophthalmic compared to the left. May be a component of lid retraction she is noticing but on worms eye she does look to have maybe 1-2 mm of asymmetry which may improve with time.    Follow-up 2 months likely can consider lid retraction repair, versus less likely consider right medial wall if needed.     Attending Physician Attestation: Complete " documentation of historical and exam elements from today's encounter can be found in the full encounter summary report (not reduplicated in this progress note). I personally obtained the chief complaint(s) and history of present illness. I confirmed and edited as necessary the review of systems, past medical/surgical history, family history, social history, and examination findings as documented by others; and I examined the patient myself. I personally reviewed the relevant tests, images, and reports as documented above. I formulated and edited as necessary the assessment and plan and discussed the findings and management plan with the patient and family. I personally reviewed the ophthalmic test(s) associated with this encounter, agree with the interpretation(s) as documented by the resident/fellow, and have edited the corresponding report(s) as necessary. Wiliam Serna MD

## 2024-10-22 ENCOUNTER — OFFICE VISIT (OUTPATIENT)
Dept: OPHTHALMOLOGY | Facility: CLINIC | Age: 39
End: 2024-10-22
Payer: COMMERCIAL

## 2024-10-22 DIAGNOSIS — Z98.890 POSTOPERATIVE STATE: ICD-10-CM

## 2024-10-22 DIAGNOSIS — H57.89 THYROID EYE DISEASE: Primary | ICD-10-CM

## 2024-10-22 DIAGNOSIS — E07.9 THYROID EYE DISEASE: Primary | ICD-10-CM

## 2024-10-22 PROCEDURE — 99207 PR NO CHG PAT LEFT NOT BEING SEEN: CPT | Mod: GC | Performed by: OPHTHALMOLOGY

## 2024-10-22 ASSESSMENT — REFRACTION_MANIFEST
OS_AXIS: 045
OD_SPHERE: +1.25
OD_AXIS: 096
OD_ADD: +1.25
OS_CYLINDER: +0.25
OD_CYLINDER: +0.25
OS_SPHERE: +1.50
OS_ADD: +1.25

## 2024-10-22 ASSESSMENT — TONOMETRY
OS_IOP_MMHG: 16
IOP_METHOD: ICARE
OD_IOP_MMHG: 16

## 2024-10-22 ASSESSMENT — CONF VISUAL FIELD
OD_INFERIOR_TEMPORAL_RESTRICTION: 0
METHOD: COUNTING FINGERS
OS_SUPERIOR_NASAL_RESTRICTION: 0
OD_SUPERIOR_NASAL_RESTRICTION: 0
OD_NORMAL: 1
OD_INFERIOR_NASAL_RESTRICTION: 0
OS_SUPERIOR_TEMPORAL_RESTRICTION: 0
OD_SUPERIOR_TEMPORAL_RESTRICTION: 0
OS_INFERIOR_NASAL_RESTRICTION: 0
OS_NORMAL: 1
OS_INFERIOR_TEMPORAL_RESTRICTION: 0

## 2024-10-22 ASSESSMENT — VISUAL ACUITY
OD_SC: 20/25
OS_PH_SC: 20/30
OS_SC: 20/50
OS_SC+: +1
METHOD: SNELLEN - LINEAR

## 2024-10-22 ASSESSMENT — EXTERNAL EXAM - LEFT EYE: OS_EXAM: NORMAL

## 2024-10-22 ASSESSMENT — EXTERNAL EXAM - RIGHT EYE: OD_EXAM: NORMAL

## 2024-10-22 NOTE — NURSING NOTE
Chief Complaints and History of Present Illnesses   Patient presents with    Post Op (Ophthalmology) Both Eyes     Status post bilateral lateral wall bone and fat orbital decompression 7/25     Chief Complaint(s) and History of Present Illness(es)       Post Op (Ophthalmology) Both Eyes              Laterality: both eyes    Onset: 3 months ago    Severity: mild    Frequency: constantly    Course: gradually improving    Comments: Status post bilateral lateral wall bone and fat orbital decompression 7/25              Comments    Healing well.  Incisions on both eyes are still a little tender.  No lump or redness. Denies diplopia.      Lou Vargas on 10/22/2024 at 10:34 AM

## 2024-10-22 NOTE — PROGRESS NOTES
Chief Complaint(s) and History of Present Illness(es)     Post Op (Ophthalmology) Both Eyes            Laterality: both eyes    Onset: 3 months ago    Severity: mild    Frequency: constantly    Course: gradually improving    Comments: Status post bilateral lateral wall bone and fat orbital   decompression 7/25          Comments    Healing well.  Incisions on both eyes are still a little tender.  No lump   or redness. Denies diplopia.      Lou LISAJulian Vargas on 10/22/2024 at 10:34 AM    Patient bothered by the appearance of asymmetric between her two eyes and still bulging appearance.   Patient state she last followed with endocrinology in August - no notes available for review, but no concerns.      Stephanie (base 101): 22/21      Assessment & Plan     Lanie Manuel is a 38 year old female with the following diagnoses:     ICD-10-CM    1. Thyroid eye disease  H57.89     E07.9       2. Postoperative state  Z98.890         Doing well. Continuing to note asymmetry between the eyes. Suspect largely lid retraction however perhaps 1mm more exophthalmic on worm's eye view and Stephanie. Patient interested in surgical repair.     - Lid retraction repair, both eyes   - Continue cool compresses, warm compresses and preservative free artificial tears for dry eye symptoms    Aravind Villalpando MD  PGY-2 Resident  Department of Ophthalmology  October 22, 2024 11:34 AM    Patient left before being seen. I left her a voicemail with numbers to call to schedule further follow up. From reviewing her pictures, it appears she has healed nicely, she does have some right lower eyelid retraction relative to the left.        Attending Physician Attestation: Complete documentation of historical and exam elements from today's encounter can be found in the full encounter summary report (not reduplicated in this progress note). I personally obtained the chief complaint(s) and history of present illness. I confirmed and edited as necessary the  review of systems, past medical/surgical history, family history, social history, and examination findings as documented by others; and I examined the patient myself. I personally reviewed the relevant tests, images, and reports as documented above. I formulated and edited as necessary the assessment and plan and discussed the findings and management plan with the patient.  -Wiliam Serna MD

## 2024-11-05 ENCOUNTER — OFFICE VISIT (OUTPATIENT)
Dept: OPHTHALMOLOGY | Facility: CLINIC | Age: 39
End: 2024-11-05
Payer: COMMERCIAL

## 2024-11-05 DIAGNOSIS — E05.00 GRAVES DISEASE: ICD-10-CM

## 2024-11-05 DIAGNOSIS — H57.89 THYROID EYE DISEASE: Primary | ICD-10-CM

## 2024-11-05 DIAGNOSIS — H02.539 EYELID RETRACTION OR LAG: ICD-10-CM

## 2024-11-05 DIAGNOSIS — E07.9 THYROID EYE DISEASE: Primary | ICD-10-CM

## 2024-11-05 PROCEDURE — 99214 OFFICE O/P EST MOD 30 MIN: CPT | Performed by: OPHTHALMOLOGY

## 2024-11-05 ASSESSMENT — MARGIN REFLEX DISTANCE
OD_MRD2: 8
OS_MRD2: 6
OS_MRD1: 5
OD_MRD1: 6

## 2024-11-05 ASSESSMENT — LAGOPHTHALMOS
OD_LAGOPHTHALMOS: 2
OS_LAGOPHTHALMOS: 0

## 2024-11-05 ASSESSMENT — EXTERNAL EXAM - RIGHT EYE: OD_EXAM: NORMAL

## 2024-11-05 ASSESSMENT — VISUAL ACUITY: METHOD: SNELLEN - LINEAR

## 2024-11-05 ASSESSMENT — EXTERNAL EXAM - LEFT EYE: OS_EXAM: NORMAL

## 2024-11-05 NOTE — PROGRESS NOTES
Chief Complaint(s) and History of Present Illness(es)     Follow Up            Laterality: both eyes    Pain scale: 0/10    Comments: S/p Bilateral lateral orbital decompression with intraoperative   navigation 7/25/24               Comments    Patient reports she had to leave before seeing Dr on 10/22/24 visit and   back to follow up with him.     Patient reports that concern are consistent with last visit.  With being bothered by the appearance of asymmetry between her two eyes   and still bulging appearance.   Patient state she last followed with endocrinology in August - no notes   available for review, but no concerns. Pending appt later this months.     No eye pain. Vision has remained stable with each eye.   No issues with double vision each eye.     Would like to consider eye surgery with each eye.   Patient wished  to decline any assessment today today and would like to   defer to last exam on 10/22/24 rather. No additional pictures taken today.           Annalisa Warner, COT COT 10:27 AM November 5, 2024       Assessment & Plan     Lanie Manuel is a 38 year old female with the following diagnoses:   Encounter Diagnoses   Name Primary?    Thyroid eye disease Yes    Graves disease     Eyelid retraction or lag      Healed well post bilateral lateral orbital decompression.   She has asymmetric lid retraction more right upper and lower lid retraction than the left.   We discussed options of unilateral or bilateral surgery. She has more dry eye findings on the right and lagophthalmos.     Will start with: Right lower eyelid retraction repair with acellular dermis and temporary tarsorrhaphy. Postoperatively can decide if that is adequate or if we would then like to proceed with right upper eyelid retraction repair or left lower eyelid repair based on symptoms.        Attending Physician Attestation: Complete documentation of historical and exam elements from today's encounter can be found in the full  encounter summary report (not reduplicated in this progress note). I personally obtained the chief complaint(s) and history of present illness. I confirmed and edited as necessary the review of systems, past medical/surgical history, family history, social history, and examination findings as documented by others; and I examined the patient myself. I personally reviewed the relevant tests, images, and reports as documented above. I formulated and edited as necessary the assessment and plan and discussed the findings and management plan with the patient.  -Wiliam Serna MD    Today with Lanie Manuel, I reviewed the indications, risks, benefits, and alternatives of the proposed surgical procedure including, but not limited to, failure obtain the desired result  and need for additional surgery, bleeding, infection, injury to the eye, and the remote possibility of permanent damage to any organ system or death with the use of anesthesia.  I provided multiple opportunities for the questions, answered all questions to the best of my ability, and confirmed that my answers and my discussion were understood.   Wiliam Serna MD

## 2024-11-05 NOTE — NURSING NOTE
Chief Complaints and History of Present Illnesses   Patient presents with    Follow Up     S/p Bilateral lateral orbital decompression with intraoperative navigation 7/25/24          Chief Complaint(s) and History of Present Illness(es)       Follow Up              Laterality: both eyes    Pain scale: 0/10    Comments: S/p Bilateral lateral orbital decompression with intraoperative navigation 7/25/24                   Comments    Patient reports she had to leave before seeing Dr on 10/22/24 visit and back to follow up with him.     Patient reports that concern are consistent with last visit.  With being bothered by the appearance of asymmetry between her two eyes and still bulging appearance.   Patient state she last followed with endocrinology in August - no notes available for review, but no concerns. Pending appt later this months.     No eye pain. Vision has remained stable with each eye.   No issues with double vision each eye.     Would like to consider eye surgery with each eye.     ESTEVAN Frausto COT 10:27 AM November 5, 2024

## 2024-11-06 ENCOUNTER — TELEPHONE (OUTPATIENT)
Dept: OPHTHALMOLOGY | Facility: CLINIC | Age: 39
End: 2024-11-06
Payer: COMMERCIAL

## 2024-11-06 NOTE — TELEPHONE ENCOUNTER
Message left for the patient to call back to get surgery scheduled with Dr. Serna.     Lili Leong  Surgery Scheduling Coordinator  Ph: [phone]

## 2024-11-11 PROBLEM — H02.539 EYELID RETRACTION OR LAG: Status: ACTIVE | Noted: 2024-11-05

## 2024-11-11 PROBLEM — E05.00 GRAVES DISEASE: Status: ACTIVE | Noted: 2017-06-07

## 2024-11-11 NOTE — TELEPHONE ENCOUNTER
Called patient to schedule surgery with Dr. Serna    Spoke with: Lanie    Date(s) of Surgery: 12-31-24    Patient aware of approximate arrival time: No at Pt to get a call a couple of days prior to surgery     Location of surgery: [facility]     Pre-Op H&P: [facility]     Informed patient that they need to call to schedule pre-op H&P within 30 days of surgery date: Yes      Post-Op Appt Dates: 1-7-25       Discussed with patient pre-op RN will call 2-3 days prior to surgery with arrival time and instructions:  Yes       Standard Surgery Packet Sent: Yes 11/11/24  via Mail - Standard      Additional Information Sent in Packet:          Informed patient that they will need an adult  to bring patient home from surgery: Yes  : Will have family drive         Additional Comments:        All patients questions were answered and was instructed to review surgical packet and call back [phone] with any questions or concerns.       Lili Leong on 11/11/2024 at 12:25 PM

## 2024-12-26 ENCOUNTER — VIRTUAL VISIT (OUTPATIENT)
Dept: SURGERY | Facility: CLINIC | Age: 39
End: 2024-12-26
Payer: COMMERCIAL

## 2024-12-26 ENCOUNTER — ANESTHESIA EVENT (OUTPATIENT)
Dept: SURGERY | Facility: AMBULATORY SURGERY CENTER | Age: 39
End: 2024-12-26
Payer: COMMERCIAL

## 2024-12-26 ENCOUNTER — PRE VISIT (OUTPATIENT)
Dept: SURGERY | Facility: CLINIC | Age: 39
End: 2024-12-26

## 2024-12-26 VITALS — BODY MASS INDEX: 30.05 KG/M2 | HEIGHT: 64 IN | WEIGHT: 176 LBS

## 2024-12-26 DIAGNOSIS — E07.9 THYROID EYE DISEASE: ICD-10-CM

## 2024-12-26 DIAGNOSIS — H02.539 EYELID RETRACTION OR LAG: ICD-10-CM

## 2024-12-26 DIAGNOSIS — Z01.818 PREOP EXAMINATION: Primary | ICD-10-CM

## 2024-12-26 DIAGNOSIS — H57.89 THYROID EYE DISEASE: ICD-10-CM

## 2024-12-26 DIAGNOSIS — E05.00 GRAVES DISEASE: ICD-10-CM

## 2024-12-26 RX ORDER — SEMAGLUTIDE 2.4 MG/.75ML
2.4 INJECTION, SOLUTION SUBCUTANEOUS WEEKLY
COMMUNITY
Start: 2024-11-22

## 2024-12-26 ASSESSMENT — ENCOUNTER SYMPTOMS: ORTHOPNEA: 0

## 2024-12-26 ASSESSMENT — PAIN SCALES - GENERAL: PAINLEVEL_OUTOF10: NO PAIN (0)

## 2024-12-26 NOTE — H&P
Pre-Operative H & P     CC:  Preoperative exam to assess for increased cardiopulmonary risk while undergoing surgery and anesthesia.    Date of Encounter: 12/26/2024  Primary Care Physician:  [facility]     Reason for visit:   Encounter Diagnoses   Name Primary?    Preop examination Yes    Thyroid eye disease     Graves disease     Eyelid retraction or lag        MARGUERITE  Lanie Manuel is a 39 year old female who presents for pre-operative H & P in preparation for  Procedure Information       Case: [id number] Date/Time: 12/31/24 0800    Procedure: Right lower eyelid retraction repair with acellular dermis and temporary tarsorrhaphy (Right: Eye)    Anesthesia type: MAC    Diagnosis:       Thyroid eye disease [H57.89, E07.9]      Graves disease [E05.00]      Eyelid retraction or lag [H02.539]    Pre-op diagnosis:       Thyroid eye disease [H57.89, E07.9]      Graves disease [E05.00]      Eyelid retraction or lag [H02.539]    Location: [place] [address] / [facility]    Providers: Wiliam Serna MD            Lanie Manuel is a 39 year old female with sickle cell trait, anemia, chronic thrombocytosis, obesity, history of thyroid cancer s/p thyroidectomy in 2020, history of graves disease, and history of latent TB that has thyroid eye disease and eyelid retraction or lag.  She is s/p Bilateral lateral orbital decompression with intraoperative   navigation on 7/25/24.  She has been following with Dr. Serna and noted being bothered by the appearance of asymmetry between her two eyes   and still having some bulging appearance.  The above listed procedure has been recommended for further management.     History is obtained from the patient and chart review    Hx of abnormal bleeding or anti-platelet use: none    Menstrual history: Patient's last menstrual period was 12/03/2024 (approximate).:      Past Medical History  Past Medical History:   Diagnosis  Date    Anemia     Elevated platelet count     Graves disease 2016    History of thyroid cancer     Sickle cell trait (H)        Past Surgical History  Past Surgical History:   Procedure Laterality Date     SECTION      OPTICAL TRACKING SYSTEM DECOMPRESSION ORBIT Bilateral 2024    Procedure: Bilateral lateral orbital decompression with intraoperative navigation;  Surgeon: Wiliam Serna MD;  Location: [facility] OR    SONOPET N/A 2024    Procedure: Sonopet;  Surgeon: Wiliam Serna MD;  Location: [facility] OR    THYROIDECTOMY          Prior to Admission Medications  Current Outpatient Medications   Medication Sig Dispense Refill    azelaic acid (FINACIA) 15 % external gel Apply topically every morning      levothyroxine (SYNTHROID/LEVOTHROID) 112 MCG tablet Take 112 mcg by mouth every morning (before breakfast).      tretinoin (RETIN-A) 0.025 % external cream Apply topically at bedtime      acetaminophen (TYLENOL) 500 MG tablet Take 1-2 tablets (500-1,000 mg) by mouth every 6 hours as needed for mild pain or fever (Patient not taking: Reported on 2024)      WEGOVY 2.4 MG/0.75ML pen Inject 2.4 mg subcutaneously once a week.         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   Tobacco Use    Smoking status: Never    Smokeless tobacco: Never   Substance and Sexual Activity    Alcohol use: No    Drug use: Not Currently    Sexual activity: Not on file   Other Topics Concern    Not on file   Social History Narrative    Not on file     Social Drivers of Health     Financial Resource Strain: Declined (2024)    Received from [employer]    Financial Resource Strain     Financial Resource Strain: 99   Food Insecurity: Declined  (2024)    Received from [facility]    Food Insecurity     Food: 99   Transportation Needs: Declined (2024)    Received from [employer]    Transportation Needs     Transportation: 99   Physical Activity: Not on File (10/16/2021)    Received from [facility]    Physical Activity     Physical Activity: 0   Stress: Not on File (10/16/2021)    Received from [facility]    Stress     Stress: 0   Social Connections: Not on File (2024)    Received from [employer]    Social Connections     Connectedness: 0   Interpersonal Safety: Not on file   Housing Stability: Declined (2024)    Received from [employer]    Housing Stability     Housin       Family History  Family History   Problem Relation Age of Onset    Glaucoma No family hx of     Anesthesia Reaction No family hx of     Thrombosis No family hx of        Review of Systems  The complete review of systems is negative other than noted in the HPI or here.   Anesthesia Evaluation   Pt has had prior anesthetic.     No history of anesthetic complications       ROS/MED HX  ENT/Pulmonary:  - neg pulmonary ROS  (-) recent URI   Neurologic:  - neg neurologic ROS     Cardiovascular:  - neg cardiovascular ROS   (+)  - -   -  - -                                 Previous cardiac testing   Echo: Date: Results:    Stress Test:  Date: Results:    ECG Reviewed:  Date:  Results:  Sinus rhythm  Cath:  Date: Results:   (-) SEGAL and orthopnea/PND   METS/Exercise Tolerance: >4 METS Comment: Walks on treadmill for 30 minutes 3-5 weeks.  Denies any exertional dyspnea or angina.     Hematologic: Comments: Sickle cell trait  Chronic thrombocytosis    (+)      anemia,          Musculoskeletal:  - neg musculoskeletal ROS     GI/Hepatic:  - neg GI/hepatic ROS     Renal/Genitourinary:  - neg Renal ROS     Endo: Comment: S/p thyroidectomy for graves that biopsied +for thyroid cancer.  Following at [facility]    (+)          thyroid problem, hyperthyroidism graves - s/p thyroidectomy,     "Obesity,       Psychiatric/Substance Use:  - neg psychiatric ROS     Infectious Disease: Comment: History of latent TB treated  \"a long time ago.\"      Malignancy:   (+) Malignancy, History of Other.Other CA thyroid cancer Remission status post Surgery.    Other: Comment: Thyroid eye disease  Eyelid retraction of lag           Virtual visit -  No vitals were obtained    Physical Exam  Constitutional: Awake, alert, cooperative, no apparent distress, and appears stated age.  Eyes: Pupils equal  HENT: Normocephalic  Respiratory: non labored breathing   Neurologic: Awake, alert, oriented to name, place and time.   Neuropsychiatric: Calm, cooperative. Normal affect.      Prior Labs/Diagnostic Studies   All labs and imaging personally reviewed     EKG/ stress test - if available please see in ROS above     Component      Latest Ref Rng 11/27/2023  6:06 AM   WBC      4.0 - 11.0 10e3/uL 7.6    RBC Count      3.80 - 5.20 10e6/uL 3.63 (L)    Hemoglobin      11.7 - 15.7 g/dL 10.3 (L)    Hematocrit      35.0 - 47.0 % 31.0 (L)    MCV      78 - 100 fL 85    MCH      26.5 - 33.0 pg 28.4    MCHC      31.5 - 36.5 g/dL 33.2    RDW      10.0 - 15.0 % 13.1    Platelet Count      150 - 450 10e3/uL 587 (H)    % Neutrophils      % 64    % Lymphocytes      % 27    % Monocytes      % 7    % Eosinophils      % 1    % Basophils      % 0    % Immature Granulocytes      % 1    NRBCs per 100 WBC      <1 /100 0    Absolute Neutrophils      1.6 - 8.3 10e3/uL 4.9    Absolute Lymphocytes      0.8 - 5.3 10e3/uL 2.0    Absolute Monocytes      0.0 - 1.3 10e3/uL 0.5    Absolute Eosinophils      0.0 - 0.7 10e3/uL 0.1    Absolute Basophils      0.0 - 0.2 10e3/uL 0.0    Absolute Immature Granulocytes      <=0.4 10e3/uL 0.1    Absolute NRBCs      10e3/uL 0.0    Sodium      135 - 145 mmol/L 137    Potassium      3.4 - 5.3 mmol/L 4.0    Chloride      98 - 107 mmol/L 102    Carbon Dioxide (CO2)      22 - 29 mmol/L 26    Anion Gap      7 - 15 mmol/L 9    Urea " "Nitrogen      6.0 - 20.0 mg/dL 7.3    Creatinine      0.51 - 0.95 mg/dL 0.63    GFR Estimate      >60 mL/min/1.73m2 >90    Calcium      8.6 - 10.0 mg/dL 7.7 (L)    Glucose      70 - 99 mg/dL 93     *will order CBC recheck for DOS*  Legend:  (L) Low  (H) High  The patient's records and results personally reviewed by this provider.     Outside records reviewed from: Care Everywhere      Assessment    Lanie Manuel is a 39 year old female seen as a PAC referral for risk assessment and optimization for anesthesia.    Plan/Recommendations  Pt will be optimized for the proposed procedure.  See below for details on the assessment, risk, and preoperative recommendations    NEUROLOGY  - No history of TIA, CVA or seizure    -Post Op delirium risk factors:  No risk identified    ENT  - No current airway concerns.  Will need to be reassessed day of surgery.  Mallampati: Unable to assess  TM: Unable to assess    CARDIAC  - No history of CAD, Hypertension, and Afib  - METS (Metabolic Equivalents)  Patient performs 4 or more METS exercise without symptoms             Total Score: 0      RCRI-Very low risk: Class 1 0.4% complication rate             Total Score: 0        PULMONARY    MICHELLE Low Risk             Total Score: 0      - Denies asthma or inhaler use  - Tobacco History    History   Smoking Status    Never   Smokeless Tobacco    Never       GI  - denies GERD  PONV High Risk  Total Score: 3           1 AN PONV: Pt is Female    1 AN PONV: Patient is not a current smoker    1 AN PONV: Intended Post Op Opioids            ENDOCRINE    - BMI: Estimated body mass index is 30.21 kg/m  as calculated from the following:    Height as of this encounter: 1.626 m (5' 4\").    Weight as of this encounter: 79.8 kg (176 lb).  Obesity (BMI >30)  - Thyroid disorder (post-surgical hypothyroidism)  Continue home replacement while hospitalized.    - on wegovy for weight loss.  Has had it on hold since early December in preparation for " surgery.    HEME  VTE Low Risk 0.26%             Total Score: 0      - No history of abnormal bleeding or antiplatelet use.  - Chronic anemia  Recommend perioperative use of blood conservation techniques intraoperatively and close monitoring for postoperative bleeding.  - sickle cell trait  - chronic thrombocytosis.  Recheck CBC DOS            Different anesthesia methods/types have been discussed with the patient, but they are aware that the final plan will be decided by the assigned anesthesia provider on the date of service.      The patient is optimized for their procedure. AVS with information on surgery time/arrival time, meds and NPO status given by nursing staff. No further diagnostic testing indicated.    Please refer to the physical examination documented by the anesthesiologist in the anesthesia record on the day of surgery.    Video-Visit Details    Type of service:  Video Visit    Provider received verbal consent for a Video Visit from the patient? Yes   Video Start Time: 1055  Video End Time: 1105    Originating Location (pt. Location): Home    Distant Location (provider location):  Off-site  Mode of Communication:  Video Conference via [employer]    On the day of service:     Prep time: 12 minutes  Visit time: 10 minutes  Documentation time: 10 minutes  ------------------------------------------  Total time: 32 minutes      MAUREEN Mayen CNP  Preoperative Assessment Center  [facility]  Clinic and Surgery Center  Phone: [phone]  Fax: [phone]

## 2024-12-26 NOTE — PATIENT INSTRUCTIONS
Preparing for Your Surgery      Name:  Lanie Manuel   MRN:  [id number]   :  1985   Today's Date:  2024         Arriving for surgery:  Surgery date:  24  Arrival time:  6.30AM    Restrictions due to COVID 19:    Please maintain social distance.  Masking is optional.     [employer] parking is available for anyone with mobility limitations or disabilities. (Monday- Friday 7 am- 5 pm)    Please come to:    [facility]  [address] [place] [id number]    Please check in on the 5th floor at the Ambulatory Surgery Center.      What can I eat or drink?    -  You may eat and drink normally until 8 hours prior to arrival  time. (Until 10.30AM)  -  You may have clear liquids until 2 hours prior to arrival  time. (Until 4.30AM)    Examples of clear liquids:  Water  Clear broth  Juices (apple, white grape, white cranberry  and cider) without pulp  Noncarbonated, powder based beverages  (lemonade and Sebastian-Aid)  Sodas (Sprite, 7-Up, ginger ale and seltzer)  Coffee or tea (without milk or cream)  Gatorade      Which medicines can I take?    Hold Aspirin for 7 days before surgery.   Hold Multivitamins for 7 days before surgery.  Hold Supplements for 7 days before surgery.  Hold Ibuprofen (Advil, Motrin) for 1 day before surgery--unless otherwise directed by surgeon.  Hold Naproxen (Aleve) for 4 days before surgery.    No alcohol or cannabis products for 24 hours prior to procedure.    Hold Wegovy injection for 7 days before procedure.      -  DO NOT take the following medications the day of surgery:  Continue to hold Wegovy injection  External gel, cream    -  PLEASE TAKE the following medications the day of surgery:   Levothyroxine (Synthroid)      How do I prepare myself?  - Please take 2 showers (one the night prior to surgery and one the morning of surgery) using Scrubcare or Hibiclens soap.    Use this soap only from the neck to your toes. Avoid genital area      Leave  the soap on your skin for one minute--then rinse thoroughly.      You may use your own shampoo and conditioner. No other hair products.   - Please remove all jewelry and body piercings.  - No lotions, deodorants or fragrance.  - No makeup or fingernail polish.   - Bring your ID and insurance card.    -If you have a Deep Brain Stimulator, a Spinal Cord Stimulator, or any implanted Neuro Device, you must bring the remote to the Surgery Center.         ALL PATIENTS ARE REQUIRED TO HAVE A RESPONSIBLE ADULT TO DRIVE AND BE IN ATTENDANCE WITH THEM FOR 24 HOURS FOLLOWING SURGERY.     Covid testing policy as of 12/06/2022  Your surgeon will notify and schedule you for a COVID test if one is needed before surgery--please direct any questions or COVID symptoms to your surgeon      Questions or Concerns:    -For questions regarding the day of surgery, please contact the Ambulatory Surgery Center at [phone].    -If you have health changes between today and your surgery, please contact your surgeon.     - For questions after surgery, please contact your surgeon's office.

## 2024-12-26 NOTE — LETTER
2024    Lanie Manuel   1985        To Whom it May Concern;    Please excuse Lanie Manuel from work/school for a healthcare visit on Dec 26, 2024.    Sincerely,        MAUREEN Mayen CNP

## 2024-12-26 NOTE — PROGRESS NOTES
Lanie is a 39 year old who is being evaluated via a billable video visit.    How would you like to obtain your AVS? E-Mail a copy  [email or web address]  If the video visit is dropped, the invitation should be resent by: Text to cell phone: [phone]    Steph Dela Cruz is a 39 year old, presenting for the following health issues:  Pre-Op Exam      HPI         Physical Exam

## 2024-12-26 NOTE — TELEPHONE ENCOUNTER
FUTURE VISIT INFORMATION      SURGERY INFORMATION:  Date: 24  [facility]  Surgeon:  Wiliam Serna MD   Anesthesia Type:  mac  Procedure: Right lower eyelid retraction repair with acellular dermis and temporary tarsorrhaphy   Consult: ov     RECORDS REQUESTED FROM:       Primary Care Provider: [facility]     Most recent EKG+ Tracin/15/20- Puneet

## 2024-12-31 ENCOUNTER — TELEPHONE (OUTPATIENT)
Dept: OPHTHALMOLOGY | Facility: CLINIC | Age: 39
End: 2024-12-31

## 2024-12-31 ENCOUNTER — ANESTHESIA (OUTPATIENT)
Dept: SURGERY | Facility: AMBULATORY SURGERY CENTER | Age: 39
End: 2024-12-31
Payer: COMMERCIAL

## 2024-12-31 ENCOUNTER — HOSPITAL ENCOUNTER (OUTPATIENT)
Facility: AMBULATORY SURGERY CENTER | Age: 39
Discharge: HOME | End: 2024-12-31
Attending: OPHTHALMOLOGY
Payer: COMMERCIAL

## 2024-12-31 VITALS
RESPIRATION RATE: 16 BRPM | HEIGHT: 64 IN | SYSTOLIC BLOOD PRESSURE: 102 MMHG | TEMPERATURE: 97.3 F | DIASTOLIC BLOOD PRESSURE: 69 MMHG | HEART RATE: 66 BPM | OXYGEN SATURATION: 100 % | WEIGHT: 180 LBS | BODY MASS INDEX: 30.73 KG/M2

## 2024-12-31 DIAGNOSIS — Z98.890 POSTOPERATIVE EYE STATE: Primary | ICD-10-CM

## 2024-12-31 DEVICE — IMPLANTABLE DEVICE: Type: IMPLANTABLE DEVICE | Site: EYELID | Status: FUNCTIONAL

## 2024-12-31 RX ORDER — SODIUM CHLORIDE, SODIUM LACTATE, POTASSIUM CHLORIDE, CALCIUM CHLORIDE 600; 310; 30; 20 MG/100ML; MG/100ML; MG/100ML; MG/100ML
INJECTION, SOLUTION INTRAVENOUS CONTINUOUS
Status: DISCONTINUED | OUTPATIENT
Start: 2024-12-31 | End: 2024-12-31 | Stop reason: HOSPADM

## 2024-12-31 RX ORDER — LIDOCAINE HYDROCHLORIDE 20 MG/ML
INJECTION, SOLUTION INFILTRATION; PERINEURAL PRN
Status: DISCONTINUED | OUTPATIENT
Start: 2024-12-31 | End: 2024-12-31

## 2024-12-31 RX ORDER — ONDANSETRON 2 MG/ML
4 INJECTION INTRAMUSCULAR; INTRAVENOUS EVERY 30 MIN PRN
Status: DISCONTINUED | OUTPATIENT
Start: 2024-12-31 | End: 2025-01-01 | Stop reason: HOSPADM

## 2024-12-31 RX ORDER — OXYCODONE HYDROCHLORIDE 5 MG/1
5 TABLET ORAL EVERY 6 HOURS PRN
Qty: 12 TABLET | Refills: 0 | Status: SHIPPED | OUTPATIENT
Start: 2024-12-31 | End: 2025-01-03

## 2024-12-31 RX ORDER — ONDANSETRON 4 MG/1
4 TABLET, ORALLY DISINTEGRATING ORAL EVERY 30 MIN PRN
Status: DISCONTINUED | OUTPATIENT
Start: 2024-12-31 | End: 2025-01-01 | Stop reason: HOSPADM

## 2024-12-31 RX ORDER — OXYCODONE HYDROCHLORIDE 5 MG/1
10 TABLET ORAL
Status: DISCONTINUED | OUTPATIENT
Start: 2024-12-31 | End: 2025-01-01 | Stop reason: HOSPADM

## 2024-12-31 RX ORDER — ACETAMINOPHEN 325 MG/1
975 TABLET ORAL ONCE
Status: COMPLETED | OUTPATIENT
Start: 2024-12-31 | End: 2024-12-31

## 2024-12-31 RX ORDER — OXYCODONE HYDROCHLORIDE 5 MG/1
5 TABLET ORAL
Status: COMPLETED | OUTPATIENT
Start: 2024-12-31 | End: 2024-12-31

## 2024-12-31 RX ORDER — TETRACAINE HYDROCHLORIDE 5 MG/ML
SOLUTION OPHTHALMIC PRN
Status: DISCONTINUED | OUTPATIENT
Start: 2024-12-31 | End: 2024-12-31 | Stop reason: HOSPADM

## 2024-12-31 RX ORDER — NEOMYCIN POLYMYXIN B SULFATES AND DEXAMETHASONE 3.5; 10000; 1 MG/ML; [USP'U]/ML; MG/ML
SUSPENSION/ DROPS OPHTHALMIC
Qty: 5 ML | Refills: 0 | Status: SHIPPED | OUTPATIENT
Start: 2025-01-03

## 2024-12-31 RX ORDER — NEOMYCIN POLYMYXIN B SULFATES AND DEXAMETHASONE 3.5; 10000; 1 MG/ML; [USP'U]/ML; MG/ML
SUSPENSION/ DROPS OPHTHALMIC
Qty: 5 ML | Refills: 0 | Status: SHIPPED | OUTPATIENT
Start: 2025-01-03 | End: 2024-12-31

## 2024-12-31 RX ORDER — ERYTHROMYCIN 5 MG/G
OINTMENT OPHTHALMIC PRN
Status: DISCONTINUED | OUTPATIENT
Start: 2024-12-31 | End: 2024-12-31 | Stop reason: HOSPADM

## 2024-12-31 RX ORDER — PROPOFOL 10 MG/ML
INJECTION, EMULSION INTRAVENOUS PRN
Status: DISCONTINUED | OUTPATIENT
Start: 2024-12-31 | End: 2024-12-31

## 2024-12-31 RX ORDER — DEXAMETHASONE SODIUM PHOSPHATE 10 MG/ML
4 INJECTION, SOLUTION INTRAMUSCULAR; INTRAVENOUS
Status: DISCONTINUED | OUTPATIENT
Start: 2024-12-31 | End: 2025-01-01 | Stop reason: HOSPADM

## 2024-12-31 RX ORDER — NALOXONE HYDROCHLORIDE 0.4 MG/ML
0.1 INJECTION, SOLUTION INTRAMUSCULAR; INTRAVENOUS; SUBCUTANEOUS
Status: DISCONTINUED | OUTPATIENT
Start: 2024-12-31 | End: 2025-01-01 | Stop reason: HOSPADM

## 2024-12-31 RX ORDER — FENTANYL CITRATE 50 UG/ML
INJECTION, SOLUTION INTRAMUSCULAR; INTRAVENOUS PRN
Status: DISCONTINUED | OUTPATIENT
Start: 2024-12-31 | End: 2024-12-31

## 2024-12-31 RX ORDER — ERYTHROMYCIN 5 MG/G
OINTMENT OPHTHALMIC
Qty: 3.5 G | Refills: 1 | Status: SHIPPED | OUTPATIENT
Start: 2024-12-31

## 2024-12-31 RX ORDER — LIDOCAINE 40 MG/G
CREAM TOPICAL
Status: DISCONTINUED | OUTPATIENT
Start: 2024-12-31 | End: 2024-12-31 | Stop reason: HOSPADM

## 2024-12-31 RX ORDER — NEOMYCIN POLYMYXIN B SULFATES AND DEXAMETHASONE 3.5; 10000; 1 MG/ML; [USP'U]/ML; MG/ML
SUSPENSION/ DROPS OPHTHALMIC
Qty: 5 ML | Refills: 0 | Status: SHIPPED | OUTPATIENT
Start: 2024-12-31 | End: 2024-12-31

## 2024-12-31 RX ORDER — CEPHALEXIN 500 MG/1
500 CAPSULE ORAL 2 TIMES DAILY
Qty: 20 CAPSULE | Refills: 0 | Status: SHIPPED | OUTPATIENT
Start: 2024-12-31 | End: 2024-12-31

## 2024-12-31 RX ADMIN — SODIUM CHLORIDE, SODIUM LACTATE, POTASSIUM CHLORIDE, CALCIUM CHLORIDE: 600; 310; 30; 20 INJECTION, SOLUTION INTRAVENOUS at 07:18

## 2024-12-31 RX ADMIN — FENTANYL CITRATE 25 MCG: 50 INJECTION, SOLUTION INTRAMUSCULAR; INTRAVENOUS at 07:57

## 2024-12-31 RX ADMIN — ACETAMINOPHEN 975 MG: 325 TABLET ORAL at 08:55

## 2024-12-31 RX ADMIN — PROPOFOL 60 MG: 10 INJECTION, EMULSION INTRAVENOUS at 08:03

## 2024-12-31 RX ADMIN — OXYCODONE HYDROCHLORIDE 5 MG: 5 TABLET ORAL at 08:50

## 2024-12-31 RX ADMIN — FENTANYL CITRATE 25 MCG: 50 INJECTION, SOLUTION INTRAMUSCULAR; INTRAVENOUS at 08:23

## 2024-12-31 RX ADMIN — LIDOCAINE HYDROCHLORIDE 80 MG: 20 INJECTION, SOLUTION INFILTRATION; PERINEURAL at 08:03

## 2024-12-31 ASSESSMENT — ENCOUNTER SYMPTOMS
ORTHOPNEA: 0
SEIZURES: 0

## 2024-12-31 ASSESSMENT — COPD QUESTIONNAIRES: COPD: 0

## 2024-12-31 NOTE — OP NOTE
PREOPERATIVE DIAGNOSIS: Right lower eyelid retraction.   POSTOPERATIVE DIAGNOSIS: Right  lower eyelid retraction.   PROCEDURE PERFORMED: Right lower eyelid retraction repair with conjunctivoplasty, Alloderm (acellular human dermal matrix) graft, lateral canthopexy and temporary Pete suture tarsorrhaphy.   SURGEON: Wiliam Serna MD  ASSISTANTS: Patti Perry MD and Aravind Villalpando MD  ANESTHESIA: Monitored with local infiltration of a 50/50 mixture of 2% lidocaine with epinephrine and 0.5% Marcaine.   COMPLICATIONS: None.   ESTIMATED BLOOD LOSS: Less than 5 mL.   HISTORY OF PRESENT ILLNESS: Lanie Manuel  presented with right lower lid retraction leading to exposure keratitis. After the risks, benefits and alternatives to the proposed procedure were explained, informed consent was obtained.   DESCRIPTION OF PROCEDURE: Lanie Manuel was brought to the operating room and placed supine on the operating table.  The right  lower lid lateral canthus, upper lid and brow were infiltrated with local anesthetic. Anesthesia was infiltrated. The area prepped and draped in the typical sterile ophthalmic fashion. Attention was directed to the right side. Lateral canthal incision was made with a 15-blade and dissection carried down through the orbicularis with cautery. Lateral canthotomy and inferior cantholysis was performed. A 4-0 silk suture was used as a traction suture through he lower eyelid margin. Transconjunctival incision was performed with cautery and Cary scissors at the inferior tarsal border releasing the lower eyelid retractors and conjunctiva. The Alloderm acellular dermis graft was sutured in place with running 6-0 plain gut sutures, securing the graft to the inferior tarsal border superiorly and inferiorly to the inferior conjunctival edge. The lateral tarsus was reattached to the lateral orbital rim periosteum with a double-armed 5-0 vicryl suture in a horizontal mattress fashion lateral  canthopexy. Lateral canthus was reconstructed with a 5-0 Vicryl suture. Skin was closed with interrupted 6-0 plain gut sutures. The 4-0 silk suture was placed in a Frost suture temporary tarsorrhaphy technique through the lower lid and secured to the forehead with mastisol and steri-strips. Erythromycin ointment was applied. Lanie Manuel  tolerated the procedure well and was taken to recovery room in stable condition.     Wiliam Serna MD

## 2024-12-31 NOTE — ANESTHESIA POSTPROCEDURE EVALUATION
Patient: Lanie Manuel    Procedure: Procedure(s):  Right lower eyelid retraction repair with acellular dermis and temporary tarsorrhaphy       Anesthesia Type:  MAC    Note:  Disposition: Outpatient   Postop Pain Control: Uneventful            Sign Out: Well controlled pain   PONV: No   Neuro/Psych: Uneventful            Sign Out: Acceptable/Baseline neuro status   Airway/Respiratory: Uneventful            Sign Out: Acceptable/Baseline resp. status   CV/Hemodynamics: Uneventful            Sign Out: Acceptable CV status; No obvious hypovolemia; No obvious fluid overload   Other NRE: NONE   DID A NON-ROUTINE EVENT OCCUR? No           Last vitals:  Vitals Value Taken Time   /84 12/31/24 0914   Temp 36.2  C (97.1  F) 12/31/24 0836   Pulse 63 12/31/24 0914   Resp 16 12/31/24 0836   SpO2 100 % 12/31/24 0933   Vitals shown include unfiled device data.    Electronically Signed By: Ziyad Morales MD  December 31, 2024  9:33 AM

## 2024-12-31 NOTE — DISCHARGE INSTRUCTIONS
Post-operative Instructions  Ophthalmic Plastic and Reconstructive Surgery    Wiliam Serna M.D.     All instructions apply to the operated eye(s) or eyelid(s).    Wound care and personal care  ? If a patch or bandage has been placed, please leave this in place until seen by your physician. Ensure that the bandage does not get wet when you take a shower. The patch will be removed on Friday 1/3/25 in clinic.  ? Apply ice compresses and gentle pressure 15 minutes on, 15 minutes off, for 2 days. If you are sleeping, you don't need to wake up to ice. As long as there is no further bleeding, after two days, switch to warm water compresses for five minutes, four times a day until seen by your physician.   ? You may shower or wash your hair the day after surgery. Do not go swimming for at least 2 weeks to prevent contamination of your wounds.  ? You may go for walks and light activity is ok, but no heavy (over 15 pounds) lifting, bending or excessive straining for one week.   ? Do not apply make-up to the eyes or eyelids for 2 weeks after surgery.  ? Expect some swelling, bruising, black eye (even into the lower eyelids and cheeks). Also expect serum caking, crusting and discharge from the eye and/or incisions. A small amount of surface bleeding, and depending on the type of surgery, bleeding from the inside of the eyelid, is normal for the first 48 hours.  ? Avoid straining, bending at the waist, or lifting more than 15 pounds for 1 week. Sleeping with your head elevated, such as in a recliner, for the first several days can help swelling resolve more quickly.   ? Do continue to ambulate (walk) as you normally would - being sedentary after surgery can cause blood clots.   ? Your eye(s) and eyelid(s) may be painful and tender. This is normal after surgery.      Contact information and follow-up  ? Return to the Eye Clinic for a follow-up appointment with your physician as scheduled. If no appointment has been  scheduled:   - [facility] eye clinic: [phone] for an appointment with Dr. Serna within 2 to 3 weeks from your date of surgery.    After hours or on weekends and holidays, call [phone] and ask to speak with the ophthalmologist on call.    An on call person can be reached after hours for concerns. The on call doctor should not call in medication refill requests after hours or on weekends, so please plan accordingly. An effort has been made to provide adequate pain medications following every surgery, and refills will not be provided in most instances.     Medications  ? Restart all regular home medications and eye drops. If you take Plavix or Aspirin on a regular basis, wait for 72 hours after your surgery before restarting these in order to decrease the risk of bleeding complications.  ? Avoid aspirin and aspirin-like medications (Motrin, Aleve, Ibuprofen, [place] etc) for 72 hours to reduce the risk of bleeding. You may take Tylenol (acetaminophen) for pain.  ? In addition to your home medications, take the following post-operative medications as prescribed by your physician.    ? Apply antibiotic ointment to all sutures three times a day, and into the operated eye(s) at night.   Once you run out, you can apply Vaseline or Aquaphor (over the counter) to the incisions. Don't put the Vaseline or Aquaphor into your eyes.   ? Instill Maxitrol eyedrop three times daily for 10 days starting on 1/3/25 AFTER the patch has been removed.  ? If you have ocular irritation, you can use over the counter artificial tears such as Refresh, Systane, or Blink. Do not use Visine, Clear Eyes, or any other drop that gets the red out.   ? In many cases, postoperative discomfort can be managed with Tylenol alone. If narcotic pain medication was prescribed, take pain pills at prescribed frequency as needed for pain.    [facility]  Home Care Following Anesthesia  For 24  hours after surgery:  Get plenty of rest.  A responsible adult must stay with you for at least 24 hours after you leave the surgery center.  Do not drive or use heavy equipment.  If you have weakness or tingling, don't drive or use heavy equipment until this feeling goes away.   Do not drink alcohol.   Avoid strenuous or risky activities.  Ask for help when climbing stairs.  You may feel lightheaded.  IF so, sit for a few minutes before standing.  Have someone help you get up.   If you have nausea (feel sick to your stomach): Drink only clear liquids such as apple juice, ginger ale, broth or 7-Up.  Rest may also help.  Be sure to drink enough fluids.  Move to a regular diet as you feel able.   You may have a slight fever.  Call the doctor if your fever is over 100 F (37.7 C) (taken under the tongue) or lasts longer than 24 hours.  You may have a dry mouth, a sore throat, muscle aches or trouble sleeping. These should go away after 24 hours.  Do not make important or legal decisions.   It is recommended to avoid smoking.               Tips for taking pain medications  To get the best pain relief possible, remember these points:  Take pain medications as directed, before pain becomes severe.  Pain medication can upset your stomach: taking it with food may help.  Constipation is a common side effect of pain medication. Drink plenty of  fluids.  Eat foods high in fiber. Take a stool softener if recommended by your doctor or pharmacist.  Do not drink alcohol, drive or operate machinery while taking pain medications.  Ask about other ways to control pain, such as with heat, ice or relaxation.    Tylenol/Acetaminophen Consumption    If you feel your pain relief is insufficient, you may take Tylenol/Acetaminophen in addition to your narcotic pain medication.   Be careful not to exceed 4,000 mg of Tylenol/Acetaminophen in a 24 hour period from all sources.  If you are taking extra strength Tylenol/acetaminophen (500 mg), the  maximum dose is 8 tablets in 24 hours.  If you are taking regular strength acetaminophen (325 mg), the maximum dose is 12 tablets in 24 hours.    Call a doctor for any of the following:  Signs of infection (fever, growing tenderness at the surgery site, a large amount of drainage or bleeding, severe pain, foul-smelling drainage, redness, swelling).  It has been over 8 to 10 hours since surgery and you are still not able to urinate (pass water).  Headache for over 24 hours.  Numbness, tingling or weakness the day after surgery (if you had spinal anesthesia).  Signs of Covid-19 infection (temperature over 100 degrees, shortness of breath, cough, loss of taste/smell, generalized body aches, persistent headache, chills, sore throat, nausea/vomiting/diarrhea)    Your doctor is:       Dr. Wiliam Serna, Ophthalmology: [phone]               After hours and weekends call the hospital @ [phone] and ask for the resident on call for:  Ophthalmology  For emergency care, call the:  [facility]:  [phone] (TTY for hearing impaired: [phone])

## 2024-12-31 NOTE — TELEPHONE ENCOUNTER
Spoke with patient regarding rescheduling 1/7/25 POST-OP for patch removal to 1/3/25 with . Rescheduled patient accordingly and provided appointment details over the phone. Patient is aware of date, time and location.-Per Patient

## 2024-12-31 NOTE — ANESTHESIA CARE TRANSFER NOTE
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    Anesthesia Type:   MAC     Note:    Oropharynx: oropharynx clear of all foreign objects and spontaneously breathing  Level of Consciousness: awake  Oxygen Supplementation: room air    Independent Airway: airway patency satisfactory and stable  Dentition: dentition unchanged  Vital Signs Stable: post-procedure vital signs reviewed and stable  Report to RN Given: handoff report given  Patient transferred to: Phase II  Comments: VSS and WNL, comfortable, no PONV, report to Cady RN  Handoff Report: Identifed the Patient, Identified the Reponsible Provider, Reviewed the pertinent medical history, Discussed the surgical course, Reviewed Intra-OP anesthesia mangement and issues during anesthesia, Set expectations for post-procedure period and Allowed opportunity for questions and acknowledgement of understanding      Vitals:  Vitals Value Taken Time   /76 12/31/24 0839   Temp 36.2  C (97.1  F) 12/31/24 0836   Pulse 68 12/31/24 0839   Resp 16 12/31/24 0836   SpO2 100 % 12/31/24 0909   Vitals shown include unfiled device data.    Electronically Signed By: MAUREEN Zimmerman CRNA  December 31, 2024  9:09 AM

## 2024-12-31 NOTE — BRIEF OP NOTE
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

## 2024-12-31 NOTE — ANESTHESIA PREPROCEDURE EVALUATION
Anesthesia Pre-Procedure Evaluation    Patient: Lanie Manuel   MRN: [id number] : 1985        Procedure : Procedure(s):  Right lower eyelid retraction repair with acellular dermis and temporary tarsorrhaphy          Past Medical History:   Diagnosis Date    Anemia     Elevated platelet count     Graves disease 2016    History of thyroid cancer     Sickle cell trait (H)       Past Surgical History:   Procedure Laterality Date     SECTION      OPTICAL TRACKING SYSTEM DECOMPRESSION ORBIT Bilateral 2024    Procedure: Bilateral lateral orbital decompression with intraoperative navigation;  Surgeon: Wiliam Serna MD;  Location: [facility] OR    SONOPET N/A 2024    Procedure: Sonopet;  Surgeon: Wiliam Serna MD;  Location: [facility] OR    THYROIDECTOMY         No Known Allergies   Social History     Tobacco Use    Smoking status: Never    Smokeless tobacco: Never   Substance Use Topics    Alcohol use: No      Wt Readings from Last 1 Encounters:   24 81.6 kg (180 lb)        Anesthesia Evaluation   Pt has had prior anesthetic.     No history of anesthetic complications       ROS/MED HX  ENT/Pulmonary:  - neg pulmonary ROS  (-) asthma, COPD, sleep apnea and recent URI   Neurologic:  - neg neurologic ROS  (-) no seizures and no CVA   Cardiovascular:  - neg cardiovascular ROS   (+)  - -   -  - -                                 Previous cardiac testing   Echo: Date: Results:    Stress Test:  Date: Results:    ECG Reviewed:  Date:  Results:  Sinus rhythm  Cath:  Date: Results:   (-) SEGAL and orthopnea/PND   METS/Exercise Tolerance: >4 METS Comment: Walks on treadmill for 30 minutes 3-5 weeks.  Denies any exertional dyspnea or angina.     Hematologic: Comments: Sickle cell trait  Chronic thrombocytosis - neg hematologic  ROS   (+)      anemia,       (-) history of blood clots   Musculoskeletal:  - neg musculoskeletal ROS     GI/Hepatic:  - neg GI/hepatic ROS     Renal/Genitourinary:  -  "neg Renal ROS     Endo: Comment: S/p thyroidectomy for graves that biopsied +for thyroid cancer.  Following at [facility] - neg endo ROS   (+)          thyroid problem, hyperthyroidism graves - s/p thyroidectomy,    Obesity,    (-) Type II DM   Psychiatric/Substance Use:  - neg psychiatric ROS     Infectious Disease: Comment: History of latent TB treated  \"a long time ago.\"      Malignancy:   (+) Malignancy, History of Other.Other CA thyroid cancer Remission status post Surgery.    Other: Comment: Thyroid eye disease  Eyelid retraction of lag              OUTSIDE LABS:  CBC:   Lab Results   Component Value Date    WBC 7.6 11/27/2023    WBC 7.9 11/26/2023    HGB 10.3 (L) 11/27/2023    HGB 10.5 (L) 11/26/2023    HCT 31.0 (L) 11/27/2023    HCT 32.7 (L) 11/26/2023     (H) 11/27/2023     (H) 11/26/2023     BMP:   Lab Results   Component Value Date     11/27/2023     11/26/2023    POTASSIUM 4.0 11/27/2023    POTASSIUM 3.9 11/26/2023    CHLORIDE 102 11/27/2023    CHLORIDE 105 11/26/2023    CO2 26 11/27/2023    CO2 29 11/26/2023    BUN 7.3 11/27/2023    BUN 4.2 (L) 11/26/2023    CR 0.63 11/27/2023    CR 0.70 11/26/2023    GLC 93 11/27/2023    GLC 94 11/26/2023     COAGS: No results found for: \"PTT\", \"INR\", \"FIBR\"  POC:   Lab Results   Component Value Date    HCG Negative 12/31/2024    HCGS Negative 11/21/2023     HEPATIC:   Lab Results   Component Value Date    ALBUMIN 3.2 (L) 11/22/2023    PROTTOTAL 6.6 11/22/2023    ALT 25 11/22/2023    AST 25 11/22/2023    ALKPHOS 136 11/22/2023    BILITOTAL 0.4 11/22/2023     OTHER:   Lab Results   Component Value Date    LACT 1.1 11/21/2023    A1C 5.4 11/24/2023    LOGAN 7.7 (L) 11/27/2023    MAG 1.9 11/27/2023    LIPASE 9 (L) 11/21/2023    TSH 1.23 11/21/2023       Anesthesia Plan    ASA Status:  2    NPO Status:  NPO Appropriate    Anesthesia Type: MAC.   Induction: Propofol.   Maintenance: TIVA.        Consents    Anesthesia Plan(s) and associated risks, " "benefits, and realistic alternatives discussed. Questions answered and patient/representative(s) expressed understanding.     - Discussed: Risks, Benefits and Alternatives for BOTH SEDATION and the PROCEDURE were discussed     - Discussed with:  Patient      - Extended Intubation/Ventilatory Support Discussed: No.      - Patient is DNR/DNI Status: No     Use of blood products discussed: No .     Postoperative Care       PONV prophylaxis: Ondansetron (or other 5HT-3)     Comments:               Ziyad Morales MD    I have reviewed the pertinent notes and labs in the chart from the past 30 days and (re)examined the patient.  Any updates or changes from those notes are reflected in this note.                         # Obesity: Estimated body mass index is 30.9 kg/m  as calculated from the following:    Height as of this encounter: 1.626 m (5' 4\").    Weight as of this encounter: 81.6 kg (180 lb).             "

## 2024-12-31 NOTE — BRIEF OP NOTE
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

## 2025-01-08 ENCOUNTER — TELEPHONE (OUTPATIENT)
Dept: OPHTHALMOLOGY | Facility: CLINIC | Age: 40
End: 2025-01-08
Payer: COMMERCIAL

## 2025-01-08 NOTE — TELEPHONE ENCOUNTER
Spoke with patient regarding scheduling a POST-OP to Return in about 6 weeks (around 2/14/2025) for Follow Up. DOS 12/31/24. Scheduled patient accordingly and sent AVS Printout to confirmed address. -Appt Per PT

## 2025-02-11 ENCOUNTER — OFFICE VISIT (OUTPATIENT)
Dept: OPHTHALMOLOGY | Facility: CLINIC | Age: 40
End: 2025-02-11
Payer: COMMERCIAL

## 2025-02-11 ENCOUNTER — TELEPHONE (OUTPATIENT)
Dept: OPHTHALMOLOGY | Facility: CLINIC | Age: 40
End: 2025-02-11

## 2025-02-11 DIAGNOSIS — E07.9 THYROID EYE DISEASE: Primary | ICD-10-CM

## 2025-02-11 DIAGNOSIS — H02.539 EYELID RETRACTION OR LAG: ICD-10-CM

## 2025-02-11 DIAGNOSIS — E05.00 PROPTOSIS DUE TO THYROID DISORDER: ICD-10-CM

## 2025-02-11 DIAGNOSIS — H57.89 THYROID EYE DISEASE: Primary | ICD-10-CM

## 2025-02-11 DIAGNOSIS — H04.129 DRY EYE: ICD-10-CM

## 2025-02-11 ASSESSMENT — MARGIN REFLEX DISTANCE
OS_MRD1: 4
OS_MRD2: 6
OD_MRD2: 4
OD_MRD1: 4

## 2025-02-11 ASSESSMENT — TONOMETRY
IOP_METHOD: ICARE
OD_IOP_MMHG: 17
OS_IOP_MMHG: 16

## 2025-02-11 ASSESSMENT — VISUAL ACUITY
OS_CC+: -1
METHOD: SNELLEN - LINEAR
CORRECTION_TYPE: GLASSES
OS_CC: 20/20
OD_CC: 20/20

## 2025-02-11 ASSESSMENT — REFRACTION_WEARINGRX
SPECS_TYPE: SVL
OS_CYLINDER: SPHERE
OD_CYLINDER: SPHERE
OD_SPHERE: +1.00
OS_SPHERE: +1.00

## 2025-02-11 ASSESSMENT — EXTERNAL EXAM - LEFT EYE: OS_EXAM: NORMAL

## 2025-02-11 ASSESSMENT — EXTERNAL EXAM - RIGHT EYE: OD_EXAM: NORMAL

## 2025-02-11 NOTE — PROGRESS NOTES
Chief Complaint(s) and History of Present Illness(es)     Post Op (Ophthalmology) Right Eye            Associated symptoms: Negative for dryness, eye pain and redness    Treatments tried: no treatments    Pain scale: 0/10    Comments: POW6 s/p Right lower eyelid retraction repair with acellular   dermis and temporary tarsorrhaphy 12/31/24          Comments    Pt feels right eye has healed well.   No eye pain, redness or irritation.   Denies diplopia, tearing or discharge  No new concerns.   Ocular Meds: None    Uday Ho 10:32 AM February 11, 2025      Feels left > right eye is very dry. She has to use artificial tears quite frequently.  She is pleased with the right lower eyelid position and would like to address the left lower eyelid now.     Assessment & Plan     Lanie Manuel is a 39 year old female with the following diagnoses:   Encounter Diagnoses   Name Primary?    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     Plan: Left lower eyelid retraction repair with a small acellular dermis and temporary tarsorrhaphy.          Attending Physician Attestation: Complete documentation of historical and exam elements from today's encounter can be found in the full encounter summary report (not reduplicated in this progress note). I personally obtained the chief complaint(s) and history of present illness. I confirmed and edited as necessary the review of systems, past medical/surgical history, family history, social history, and examination findings as documented by others; and I examined the patient myself. I personally reviewed the relevant tests, images, and reports as documented above. I formulated and edited as necessary the assessment and plan and discussed the findings and management plan with the patient.  -Wiliam Serna MD    Today with Lanie COBB  Kaleb, I reviewed the indications, risks, benefits, and alternatives of the proposed surgical procedure including, but not limited to, failure obtain the desired result  and need for additional surgery, bleeding, infection, injury to the eye, over or undercorrection, corneal abrasion, and the remote possibility of permanent damage to any organ system or death with the use of anesthesia.  I provided multiple opportunities for the questions, answered all questions to the best of my ability, and confirmed that my answers and my discussion were understood.   Wiliam Serna MD

## 2025-02-11 NOTE — NURSING NOTE
Chief Complaints and History of Present Illnesses   Patient presents with    Post Op (Ophthalmology) Right Eye     POW6 s/p Right lower eyelid retraction repair with acellular dermis and temporary tarsorrhaphy 12/31/24     Chief Complaint(s) and History of Present Illness(es)       Post Op (Ophthalmology) Right Eye              Associated symptoms: Negative for dryness, eye pain and redness    Treatments tried: no treatments    Pain scale: 0/10    Comments: POW6 s/p Right lower eyelid retraction repair with acellular dermis and temporary tarsorrhaphy 12/31/24              Comments    Pt feels right eye has healed well.   No eye pain, redness or irritation.   Denies diplopia, tearing or discharge  No new concerns.   Ocular Meds: None    Uday Margarito 10:32 AM February 11, 2025

## 2025-02-11 NOTE — TELEPHONE ENCOUNTER
Message left for the patient to call back to get surgery scheduled with Dr. Serna.     Lili Leong  Surgery Scheduling Coordinator  Ph: [phone]

## 2025-02-12 PROBLEM — E07.9 THYROID EYE DISEASE: Status: ACTIVE | Noted: 2024-04-02

## 2025-02-12 PROBLEM — E05.00 PROPTOSIS DUE TO THYROID DISORDER: Status: ACTIVE | Noted: 2024-04-02

## 2025-02-12 PROBLEM — H02.539 EYELID RETRACTION OR LAG: Status: ACTIVE | Noted: 2024-11-05

## 2025-02-12 PROBLEM — H57.89 THYROID EYE DISEASE: Status: ACTIVE | Noted: 2024-04-02

## 2025-02-12 NOTE — TELEPHONE ENCOUNTER
Called patient to schedule surgery with Dr. Serna    Spoke with: Lanie    Date(s) of Surgery: 4-3-25    Patient aware of approximate arrival time: No at Pt to get a call a couple of days prior to surgery    Location of surgery: [facility]     Pre-Op H&P: [facility]     Informed patient that they need to call to schedule pre-op H&P within 30 days of surgery date: Yes      Post-Op Appt Dates: 4-8-25 10:30am [facility]       Discussed with patient pre-op RN will call 2-3 days prior to surgery with arrival time and instructions:  Yes       Standard Surgery Packet Sent: Yes 02/12/25  via Mail - Standard      Additional Information Sent in Packet:          Informed patient that they will need an adult  to bring patient home from surgery: Yes  : will have family drive         Additional Comments:        All patients questions were answered and was instructed to review surgical packet and call back [phone] any questions or concerns.       Lili Leong on 2/12/2025 at 12:56 PM

## 2025-04-02 ENCOUNTER — ANESTHESIA EVENT (OUTPATIENT)
Dept: SURGERY | Facility: AMBULATORY SURGERY CENTER | Age: 40
End: 2025-04-02
Payer: COMMERCIAL

## 2025-04-02 ENCOUNTER — TELEPHONE (OUTPATIENT)
Dept: OPHTHALMOLOGY | Facility: CLINIC | Age: 40
End: 2025-04-02
Payer: COMMERCIAL

## 2025-04-02 NOTE — TELEPHONE ENCOUNTER
Spoke with patient regarding rescheduling for an earlier same day appointment on 4/8/25 out of [facility]. Rescheduled patient as offered and patient is aware of appointment details. -Per Patient

## 2025-04-03 ENCOUNTER — ANESTHESIA (OUTPATIENT)
Dept: SURGERY | Facility: AMBULATORY SURGERY CENTER | Age: 40
End: 2025-04-03
Payer: COMMERCIAL

## 2025-04-03 ENCOUNTER — HOSPITAL ENCOUNTER (OUTPATIENT)
Facility: AMBULATORY SURGERY CENTER | Age: 40
Discharge: HOME | End: 2025-04-03
Attending: OPHTHALMOLOGY
Payer: COMMERCIAL

## 2025-04-03 VITALS
TEMPERATURE: 97.2 F | BODY MASS INDEX: 32.27 KG/M2 | DIASTOLIC BLOOD PRESSURE: 64 MMHG | SYSTOLIC BLOOD PRESSURE: 104 MMHG | WEIGHT: 189 LBS | RESPIRATION RATE: 18 BRPM | HEIGHT: 64 IN | OXYGEN SATURATION: 100 % | HEART RATE: 64 BPM

## 2025-04-03 DIAGNOSIS — H02.539 EYELID RETRACTION OR LAG: Primary | ICD-10-CM

## 2025-04-03 DIAGNOSIS — Z98.890 POSTOPERATIVE EYE STATE: ICD-10-CM

## 2025-04-03 DIAGNOSIS — E05.00 PROPTOSIS DUE TO THYROID DISORDER: ICD-10-CM

## 2025-04-03 RX ORDER — ONDANSETRON 4 MG/1
4 TABLET, ORALLY DISINTEGRATING ORAL EVERY 30 MIN PRN
Status: ACTIVE | OUTPATIENT
Start: 2025-04-03

## 2025-04-03 RX ORDER — LIDOCAINE 40 MG/G
CREAM TOPICAL
Status: ACTIVE | OUTPATIENT
Start: 2025-04-03

## 2025-04-03 RX ORDER — ONDANSETRON 2 MG/ML
4 INJECTION INTRAMUSCULAR; INTRAVENOUS EVERY 30 MIN PRN
Status: ACTIVE | OUTPATIENT
Start: 2025-04-03

## 2025-04-03 RX ORDER — CEFAZOLIN SODIUM 2 G/50ML
2 SOLUTION INTRAVENOUS SEE ADMIN INSTRUCTIONS
Status: ACTIVE | OUTPATIENT
Start: 2025-04-03

## 2025-04-03 RX ORDER — NALOXONE HYDROCHLORIDE 0.4 MG/ML
0.1 INJECTION, SOLUTION INTRAMUSCULAR; INTRAVENOUS; SUBCUTANEOUS
Status: ACTIVE | OUTPATIENT
Start: 2025-04-03

## 2025-04-03 RX ORDER — DEXAMETHASONE SODIUM PHOSPHATE 10 MG/ML
4 INJECTION, SOLUTION INTRAMUSCULAR; INTRAVENOUS
Status: ACTIVE | OUTPATIENT
Start: 2025-04-03

## 2025-04-03 RX ORDER — SODIUM CHLORIDE, SODIUM LACTATE, POTASSIUM CHLORIDE, CALCIUM CHLORIDE 600; 310; 30; 20 MG/100ML; MG/100ML; MG/100ML; MG/100ML
INJECTION, SOLUTION INTRAVENOUS CONTINUOUS
Status: ACTIVE | OUTPATIENT
Start: 2025-04-03

## 2025-04-03 RX ORDER — PROPOFOL 10 MG/ML
INJECTION, EMULSION INTRAVENOUS PRN
Status: DISCONTINUED | OUTPATIENT
Start: 2025-04-03 | End: 2025-04-03

## 2025-04-03 RX ORDER — CEFAZOLIN SODIUM 2 G/50ML
2 SOLUTION INTRAVENOUS
Status: COMPLETED | OUTPATIENT
Start: 2025-04-03 | End: 2025-04-03

## 2025-04-03 RX ORDER — OXYCODONE HYDROCHLORIDE 5 MG/1
5 TABLET ORAL EVERY 6 HOURS PRN
Qty: 15 TABLET | Refills: 0 | Status: SHIPPED | OUTPATIENT
Start: 2025-04-03 | End: 2025-04-07

## 2025-04-03 RX ORDER — OXYCODONE HYDROCHLORIDE 5 MG/1
10 TABLET ORAL
Status: ACTIVE | OUTPATIENT
Start: 2025-04-03

## 2025-04-03 RX ORDER — FENTANYL CITRATE 50 UG/ML
50 INJECTION, SOLUTION INTRAMUSCULAR; INTRAVENOUS EVERY 5 MIN PRN
Status: ACTIVE | OUTPATIENT
Start: 2025-04-03

## 2025-04-03 RX ORDER — LIDOCAINE HYDROCHLORIDE 20 MG/ML
INJECTION, SOLUTION INFILTRATION; PERINEURAL PRN
Status: DISCONTINUED | OUTPATIENT
Start: 2025-04-03 | End: 2025-04-03

## 2025-04-03 RX ORDER — FENTANYL CITRATE 50 UG/ML
INJECTION, SOLUTION INTRAMUSCULAR; INTRAVENOUS PRN
Status: DISCONTINUED | OUTPATIENT
Start: 2025-04-03 | End: 2025-04-03

## 2025-04-03 RX ORDER — ERYTHROMYCIN 5 MG/G
OINTMENT OPHTHALMIC PRN
Status: DISCONTINUED | OUTPATIENT
Start: 2025-04-03 | End: 2025-04-03 | Stop reason: HOSPADM

## 2025-04-03 RX ORDER — ACETAMINOPHEN 325 MG/1
975 TABLET ORAL ONCE
Status: COMPLETED | OUTPATIENT
Start: 2025-04-03 | End: 2025-04-03

## 2025-04-03 RX ORDER — HYDROMORPHONE HYDROCHLORIDE 1 MG/ML
0.4 INJECTION, SOLUTION INTRAMUSCULAR; INTRAVENOUS; SUBCUTANEOUS EVERY 5 MIN PRN
Status: ACTIVE | OUTPATIENT
Start: 2025-04-03

## 2025-04-03 RX ORDER — OXYCODONE HYDROCHLORIDE 5 MG/1
5 TABLET ORAL
Status: COMPLETED | OUTPATIENT
Start: 2025-04-03 | End: 2025-04-03

## 2025-04-03 RX ORDER — FENTANYL CITRATE 50 UG/ML
25 INJECTION, SOLUTION INTRAMUSCULAR; INTRAVENOUS EVERY 5 MIN PRN
Status: ACTIVE | OUTPATIENT
Start: 2025-04-03

## 2025-04-03 RX ORDER — CEFAZOLIN SODIUM 2 G/50ML
2 SOLUTION INTRAVENOUS
Status: ACTIVE | OUTPATIENT
Start: 2025-04-03

## 2025-04-03 RX ORDER — HYDROMORPHONE HYDROCHLORIDE 1 MG/ML
0.2 INJECTION, SOLUTION INTRAMUSCULAR; INTRAVENOUS; SUBCUTANEOUS EVERY 5 MIN PRN
Status: ACTIVE | OUTPATIENT
Start: 2025-04-03

## 2025-04-03 RX ORDER — TETRACAINE HYDROCHLORIDE 5 MG/ML
SOLUTION OPHTHALMIC PRN
Status: DISCONTINUED | OUTPATIENT
Start: 2025-04-03 | End: 2025-04-03 | Stop reason: HOSPADM

## 2025-04-03 RX ORDER — ERYTHROMYCIN 5 MG/G
OINTMENT OPHTHALMIC
Qty: 3.5 G | Refills: 1 | Status: SHIPPED | OUTPATIENT
Start: 2025-04-03

## 2025-04-03 RX ADMIN — ACETAMINOPHEN 975 MG: 325 TABLET ORAL at 12:09

## 2025-04-03 RX ADMIN — LIDOCAINE HYDROCHLORIDE 100 MG: 20 INJECTION, SOLUTION INFILTRATION; PERINEURAL at 13:29

## 2025-04-03 RX ADMIN — SODIUM CHLORIDE, SODIUM LACTATE, POTASSIUM CHLORIDE, CALCIUM CHLORIDE: 600; 310; 30; 20 INJECTION, SOLUTION INTRAVENOUS at 12:20

## 2025-04-03 RX ADMIN — Medication 50 MCG: at 13:45

## 2025-04-03 RX ADMIN — FENTANYL CITRATE 50 MCG: 50 INJECTION, SOLUTION INTRAMUSCULAR; INTRAVENOUS at 13:30

## 2025-04-03 RX ADMIN — PROPOFOL 50 MG: 10 INJECTION, EMULSION INTRAVENOUS at 13:31

## 2025-04-03 RX ADMIN — OXYCODONE HYDROCHLORIDE 5 MG: 5 TABLET ORAL at 14:05

## 2025-04-03 RX ADMIN — CEFAZOLIN SODIUM 2 G: 2 SOLUTION INTRAVENOUS at 13:25

## 2025-04-03 ASSESSMENT — ENCOUNTER SYMPTOMS: ORTHOPNEA: 0

## 2025-04-03 ASSESSMENT — LIFESTYLE VARIABLES: TOBACCO_USE: 0

## 2025-04-03 ASSESSMENT — COPD QUESTIONNAIRES: COPD: 0

## 2025-04-03 NOTE — ANESTHESIA POSTPROCEDURE EVALUATION
Patient: Lanie Manuel    Procedure: Procedure(s):  Left lower eyelid retraction repair with acellular dermis and temporary tarsorrhaphy       Anesthesia Type:  MAC    Note:  Disposition: Outpatient   Postop Pain Control: Uneventful            Sign Out: Well controlled pain   PONV: No   Neuro/Psych: Uneventful            Sign Out: Acceptable/Baseline neuro status   Airway/Respiratory: Uneventful            Sign Out: Acceptable/Baseline resp. status   CV/Hemodynamics: Uneventful            Sign Out: Acceptable CV status; No obvious hypovolemia; No obvious fluid overload   Other NRE:    DID A NON-ROUTINE EVENT OCCUR?            Last vitals:  Vitals Value Taken Time   /56 04/03/25 1400   Temp 36.1  C (97  F) 04/03/25 1400   Pulse 60 04/03/25 1359   Resp 18 04/03/25 1400   SpO2 100 % 04/03/25 1412   Vitals shown include unfiled device data.    Electronically Signed By: Rosy Mcknight MD  April 3, 2025  2:14 PM

## 2025-04-03 NOTE — OP NOTE
PREOPERATIVE DIAGNOSIS: Left lower eyelid retraction.   POSTOPERATIVE DIAGNOSIS: Left  lower eyelid retraction.   PROCEDURE PERFORMED: Left lower eyelid retraction repair with conjunctivoplasty, Alloderm (acellular human dermal matrix) graft, lateral canthopexy and temporary Pete suture tarsorrhaphy.   SURGEON: Wiliam Serna MD  ASSISTANTS: Clarke Mendiola MD  ANESTHESIA: Monitored with local infiltration of a 50/50 mixture of 2% lidocaine with epinephrine and 0.5% Marcaine.   COMPLICATIONS: None.   ESTIMATED BLOOD LOSS: Less than 5 mL.   HISTORY OF PRESENT ILLNESS: Lanie Manuel  presented with left lower lid retraction leading to exposure keratitis. After the risks, benefits and alternatives to the proposed procedure were explained, informed consent was obtained.   DESCRIPTION OF PROCEDURE: Lanie Manuel was brought to the operating room and placed supine on the operating table.  The left  lower lid lateral canthus, upper lid and brow were infiltrated with local anesthetic. Anesthesia was infiltrated. The area prepped and draped in the typical sterile ophthalmic fashion. Attention was directed to the left side. Lateral canthal incision was made with a 15-blade and dissection carried down through the orbicularis with cautery. Lateral canthotomy and inferior cantholysis was performed. A 4-0 silk suture was used as a traction suture through he lower eyelid margin. Transconjunctival incision was performed with cautery and Cary scissors at the inferior tarsal border releasing the lower eyelid retractors and conjunctiva. The Alloderm acellular dermis graft was sutured in place with running 6-0 plain gut sutures, securing the graft to the inferior tarsal border superiorly and inferiorly to the inferior conjunctival edge. The lateral tarsus was reattached to the lateral orbital rim periosteum with a double-armed 5-0 vicryl suture in a horizontal mattress fashion lateral canthopexy. Lateral canthus was  reconstructed with a 5-0 Vicryl suture. Skin was closed with interrupted 6-0 plain gut sutures. The 4-0 silk suture was placed in a Frost suture temporary tarsorrhaphy technique through the lower lid and secured to the forehead with mastisol and steri-strips. Erythromycin ointment was applied. Lanie Manuel  tolerated the procedure well and was taken to recovery room in stable condition.     Wiliam Serna MD

## 2025-04-03 NOTE — DISCHARGE INSTRUCTIONS
Post-operative Instructions  Ophthalmic Plastic and Reconstructive Surgery    Wiliam Serna M.D.     All instructions apply to the operated eye(s) or eyelid(s).    Wound care and personal care  ? Apply ice compresses and gentle pressure 15 minutes on, 15 minutes off, for 2 days. If you are sleeping, you don't need to wake up to ice. As long as there is no further bleeding, after two days, switch to warm water compresses for five minutes, four times a day until seen by your physician.   ? You may shower or wash your hair the day after surgery. Do not go swimming for at least 2 weeks to prevent contamination of your wounds.  ? You may go for walks and light activity is ok, but no heavy (over 15 pounds) lifting, bending or excessive straining for one week.   ? Do not apply make-up to the eyes or eyelids for 2 weeks after surgery.  ? Expect some swelling, bruising, black eye (even into the lower eyelids and cheeks). Also expect serum caking, crusting and discharge from the eye and/or incisions. A small amount of surface bleeding, and depending on the type of surgery, bleeding from the inside of the eyelid, is normal for the first 48 hours.  ? Avoid straining, bending at the waist, or lifting more than 15 pounds for 1 week. Sleeping with your head elevated, such as in a recliner, for the first several days can help swelling resolve more quickly.   ? Do continue to ambulate (walk) as you normally would - being sedentary after surgery can cause blood clots.   ? Your eye(s) and eyelid(s) may be painful and tender. This is normal after surgery.    Contact information and follow-up  ? Return to the Eye Clinic for a follow-up appointment with your physician as scheduled. If no appointment has been scheduled:   - [facility] clinic: [phone] for an appointment with Dr. Serna within 2 to 3 weeks from your date of surgery.    After hours or on weekends and holidays, call [phone] and ask to  speak with the ophthalmologist on call.    An on call person can be reached after hours for concerns. The on call doctor should not call in medication refill requests after hours or on weekends, so please plan accordingly. An effort has been made to provide adequate pain medications following every surgery, and refills will not be provided in most instances.     Medications  ? Restart all regular home medications and eye drops. If you take Plavix or Aspirin on a regular basis, wait for 72 hours after your surgery before restarting these in order to decrease the risk of bleeding complications.  ? Avoid aspirin and aspirin-like medications (Motrin, Aleve, Ibuprofen, Donna-Blythe etc) for 72 hours to reduce the risk of bleeding. You may take Tylenol (acetaminophen) for pain.  ? In addition to your home medications, take the following post-operative medications as prescribed by your physician.    ? Apply antibiotic ointment to all sutures three times a day, and into the operated eye(s) at night.   Once you run out, you can apply Vaseline or Aquaphor (over the counter) to the incisions. Don't put the Vaseline or Aquaphor into your eyes.   ? If you have ocular irritation, you can use over the counter artificial tears such as Refresh, Systane, or Blink. Do not use Visine, Clear Eyes, or any other drop that gets the red out.   ? In many cases, postoperative discomfort can be managed with Tylenol alone. If narcotic pain medication was prescribed, take pain pills at prescribed frequency as needed for pain.    WARNING:   ? Prescribed narcotic medications may make you drowsy. You must not drive a car, operate heavy machinery or drink alcohol while taking them.  ? Prescribed narcotic pain medications may cause constipation and nausea.    [facility]  Home Care Following Anesthesia  For 24 hours after surgery:  Get plenty of rest.  A responsible adult must stay with you for at least 24 hours  "after you leave the surgery center.  Do not drive or use heavy equipment.  If you have weakness or tingling, don't drive or use heavy equipment until this feeling goes away.   Do not drink alcohol.   Avoid strenuous or risky activities.  Ask for help when climbing stairs.  You may feel lightheaded.  IF so, sit for a few minutes before standing.  Have someone help you get up.   If you have nausea (feel sick to your stomach): Drink only clear liquids such as apple juice, ginger ale, broth or 7-Up.  Rest may also help.  Be sure to drink enough fluids.  Move to a regular diet as you feel able.   You may have a slight fever.  Call the doctor if your fever is over 100 F (37.7 C) (taken under the tongue) or lasts longer than 24 hours.  You may have a dry mouth, a sore throat, muscle aches or trouble sleeping. These should go away after 24 hours.  Do not make important or legal decisions.   It is recommended to avoid smoking.        Today you received a Marcaine or bupivacaine block to numb the nerves near your surgery site.  This is a block using local anesthetic or \"numbing\" medication injected around the nerves to anesthetize or \"numb\" the area supplied by those nerves.  This block is injected into the muscle layer near your surgical site.  The medication may numb the location where you had surgery for 6-18 hours, but may last up to 24 hours.  If your surgical site is an arm or leg you should be careful with your affected limb, since it is possible to injure your limb without being aware of it due to the numbing.  Until full feeling returns, you should guard against bumping or hitting your limb, and avoid extreme hot or cold temperatures on the skin.  As the block wears off, the feeling will return as a tingling or prickly sensation near your surgical site.  You will experience more discomfort from your incision as the feeling returns.  You may want to take a pain pill (a narcotic or Tylenol if this was prescribed by " your surgeon) when you start to experience mild pain before the pain beccomes more severe.  If your pain medications do not control your pain you should notifiy your surgeon.    Tips for taking pain medications  To get the best pain relief possible, remember these points:  Take pain medications as directed, before pain becomes severe.  Pain medication can upset your stomach: taking it with food may help.  Constipation is a common side effect of pain medication. Drink plenty of  fluids.  Eat foods high in fiber. Take a stool softener if recommended by your doctor or pharmacist.  Do not drink alcohol, drive or operate machinery while taking pain medications.  Ask about other ways to control pain, such as with heat, ice or relaxation.    Tylenol/Acetaminophen Consumption    If you feel your pain relief is insufficient, you may take Tylenol/Acetaminophen in addition to your narcotic pain medication.   Be careful not to exceed 4,000 mg of Tylenol/Acetaminophen in a 24 hour period from all sources.  If you are taking extra strength Tylenol/acetaminophen (500 mg), the maximum dose is 8 tablets in 24 hours.  If you are taking regular strength acetaminophen (325 mg), the maximum dose is 12 tablets in 24 hours.  Tylenol 975 mg given at 12:00 pm.   Ok to take more after 6:00 PM.    Call a doctor for any of the following:  Signs of infection (fever, growing tenderness at the surgery site, a large amount of drainage or bleeding, severe pain, foul-smelling drainage, redness, swelling).  It has been over 8 to 10 hours since surgery and you are still not able to urinate (pass water).  Headache for over 24 hours.  Numbness, tingling or weakness the day after surgery (if you had spinal anesthesia).  Signs of Covid-19 infection (temperature over 100 degrees, shortness of breath, cough, loss of taste/smell, generalized body aches, persistent headache, chills, sore throat, nausea/vomiting/diarrhea)    Your doctor is:       Dr. Swain  [place], Ophthalmology: [phone]               After hours and weekends call the hospital @ [phone] and ask for the resident on call for:  Ophthalmology  For emergency care, call the:  [facility]:  [phone] (TTY for hearing impaired: [phone])

## 2025-04-03 NOTE — ANESTHESIA PREPROCEDURE EVALUATION
Anesthesia Pre-Procedure Evaluation    Patient: Lanie Manuel   MRN: [id number] : 1985        Procedure : Procedure(s):  Left lower eyelid retraction repair with acellular dermis and temporary tarsorrhaphy          Past Medical History:   Diagnosis Date    Anemia     Elevated platelet count     Graves disease 2016    History of thyroid cancer     Sickle cell trait       Past Surgical History:   Procedure Laterality Date     SECTION      OPTICAL TRACKING SYSTEM DECOMPRESSION ORBIT Bilateral 2024    Procedure: Bilateral lateral orbital decompression with intraoperative navigation;  Surgeon: Wiliam Serna MD;  Location: [facility] OR    REPAIR RETRACTION LID Right 2024    Procedure: Right lower eyelid retraction repair with acellular dermis and temporary tarsorrhaphy;  Surgeon: Wiliam Serna MD;  Location: [facility] OR    SONOPET N/A 2024    Procedure: Sonopet;  Surgeon: Wiliam Serna MD;  Location: [facility] OR    THYROIDECTOMY         No Known Allergies   Social History     Tobacco Use    Smoking status: Never    Smokeless tobacco: Never   Substance Use Topics    Alcohol use: No      Wt Readings from Last 1 Encounters:   25 85.7 kg (189 lb)        Anesthesia Evaluation   Pt has had prior anesthetic. Type: General and MAC.    No history of anesthetic complications       ROS/MED HX  ENT/Pulmonary:    (-) tobacco use, asthma, COPD, sleep apnea and recent URI   Neurologic:       Cardiovascular:     (+)  - -   -  - -                                 Previous cardiac testing   Echo: Date: Results:    Stress Test:  Date: Results:    ECG Reviewed:  Date:  Results:  Sinus rhythm  Cath:  Date: Results:   (-) SEGAL, orthopnea/PND and syncope   METS/Exercise Tolerance: >4 METS    Hematologic: Comments: Sickle cell trait  Chronic thrombocytosis   (-) history of blood clots   Musculoskeletal:       GI/Hepatic: Comment: Wegovy has been held for a month, and patient denies any GI  "symptoms   (-) GERD   Renal/Genitourinary:       Endo:     (+)          thyroid problem, hyperthyroidism graves - s/p thyroidectomy,    Obesity,    (-) Type II DM   Psychiatric/Substance Use:       Infectious Disease: Comment: History of latent TB treated  \"a long time ago.\"      Malignancy:   (+) Malignancy, History of Other.Other CA thyroid cancer Remission status post Surgery.    Other: Comment: Thyroid eye disease  Eyelid retraction of lag    (-) Any chance pregnant       Physical Exam    Airway        Mallampati: II   TM distance: > 3 FB   Neck ROM: full   Mouth opening: > 3 cm    Respiratory Devices and Support         Dental     Comment: braces      B=Bridge, C=Chipped, L=Loose, M=Missing    Cardiovascular          Rhythm and rate: regular and normal     Pulmonary           breath sounds clear to auscultation           OUTSIDE LABS:  CBC:   Lab Results   Component Value Date    WBC 7.6 11/27/2023    WBC 7.9 11/26/2023    HGB 10.3 (L) 11/27/2023    HGB 10.5 (L) 11/26/2023    HCT 31.0 (L) 11/27/2023    HCT 32.7 (L) 11/26/2023     (H) 11/27/2023     (H) 11/26/2023     BMP:   Lab Results   Component Value Date     11/27/2023     11/26/2023    POTASSIUM 4.0 11/27/2023    POTASSIUM 3.9 11/26/2023    CHLORIDE 102 11/27/2023    CHLORIDE 105 11/26/2023    CO2 26 11/27/2023    CO2 29 11/26/2023    BUN 7.3 11/27/2023    BUN 4.2 (L) 11/26/2023    CR 0.63 11/27/2023    CR 0.70 11/26/2023    GLC 93 11/27/2023    GLC 94 11/26/2023     COAGS: No results found for: \"PTT\", \"INR\", \"FIBR\"  POC:   Lab Results   Component Value Date    HCG Negative 04/03/2025    HCGS Negative 11/21/2023     HEPATIC:   Lab Results   Component Value Date    ALBUMIN 3.2 (L) 11/22/2023    PROTTOTAL 6.6 11/22/2023    ALT 25 11/22/2023    AST 25 11/22/2023    ALKPHOS 136 11/22/2023    BILITOTAL 0.4 11/22/2023     OTHER:   Lab Results   Component Value Date    LACT 1.1 11/21/2023    A1C 5.4 11/24/2023    LOGAN 7.7 (L) 11/27/2023 " "   MAG 1.9 11/27/2023    LIPASE 9 (L) 11/21/2023    TSH 1.23 11/21/2023       Anesthesia Plan    ASA Status:  2    NPO Status:  NPO Appropriate    Anesthesia Type: MAC.     - Reason for MAC: straight local not clinically adequate              Consents    Anesthesia Plan(s) and associated risks, benefits, and realistic alternatives discussed. Questions answered and patient/representative(s) expressed understanding.     - Discussed:     - Discussed with:  Patient            Postoperative Care    Pain management: IV analgesics, Oral pain medications, Multi-modal analgesia.        Comments:    Other Comments: Discussed plan for MAC, including possibility of awareness, risk of aspiration pneumonia, risk of hypoxia/low oxygen/airway obstruction requiring additional airway support/devices. Discussed alternatives. Discussed backup plan of general anesthesia and risks of general anesthesia, including sore throat/hoarse voice, abrasions/damage to lips/tongue/teeth, nausea, rare complications (including medication reactions, cardiac, pulmonary, recall). Ensured understanding, invited questions and all questions were answered.               Rosy Mcknight MD    Clinically Significant Risk Factors Present on Admission                             # Obesity: Estimated body mass index is 32.44 kg/m  as calculated from the following:    Height as of this encounter: 1.626 m (5' 4\").    Weight as of this encounter: 85.7 kg (189 lb).                "

## 2025-04-03 NOTE — BRIEF OP NOTE
[facility]    Brief Operative Note    Pre-operative diagnosis: Thyroid eye disease [H57.89, E07.9]  Eyelid retraction or lag [H02.539]  Proptosis due to thyroid disorder [E05.00]  Post-operative diagnosis Same as pre-operative diagnosis    Procedure: Left lower eyelid retraction repair with acellular dermis and temporary tarsorrhaphy, Left - Eye    Surgeon: Surgeons and Role:     * Wiliam Serna MD - Primary     * Clarke Mendiola MD - Resident - Assisting  Anesthesia: MAC with Local   Estimated Blood Loss: Minimal    Drains: None  Specimens: * No specimens in log *  Findings:   None.  Complications: None.  Implants:   Implant Name Type Inv. Item Serial No.  Lot No. LRB No. Used Action   Alloderm select 1x4 medium 1.2-2.0    [employer] [id number] Left 1 Implanted

## 2025-04-03 NOTE — ANESTHESIA CARE TRANSFER NOTE
Patient: Lanie Manuel    Procedure: Procedure(s):  Left lower eyelid retraction repair with acellular dermis and temporary tarsorrhaphy       Diagnosis: Thyroid eye disease [H57.89, E07.9]  Eyelid retraction or lag [H02.539]  Proptosis due to thyroid disorder [E05.00]  Diagnosis Additional Information: No value filed.    Anesthesia Type:   No value filed.     Note:    Oropharynx: spontaneously breathing  Level of Consciousness: awake  Oxygen Supplementation: room air    Independent Airway: airway patency satisfactory and stable  Dentition: dentition unchanged  Vital Signs Stable: post-procedure vital signs reviewed and stable  Report to RN Given: handoff report given  Patient transferred to: Phase II    Handoff Report: Identifed the Patient, Identified the Reponsible Provider, Reviewed the pertinent medical history, Discussed the surgical course, Reviewed Intra-OP anesthesia mangement and issues during anesthesia, Set expectations for post-procedure period and Allowed opportunity for questions and acknowledgement of understanding  Vitals:  Vitals Value Taken Time   BP     Temp     Pulse     Resp     SpO2         Electronically Signed By: MAUREEN Lugo CRNA  April 3, 2025  1:56 PM

## 2025-04-08 ENCOUNTER — TELEPHONE (OUTPATIENT)
Dept: OPHTHALMOLOGY | Facility: CLINIC | Age: 40
End: 2025-04-08

## 2025-04-08 ENCOUNTER — OFFICE VISIT (OUTPATIENT)
Dept: OPHTHALMOLOGY | Facility: CLINIC | Age: 40
End: 2025-04-08
Payer: COMMERCIAL

## 2025-04-08 DIAGNOSIS — E07.9 THYROID EYE DISEASE: Primary | ICD-10-CM

## 2025-04-08 DIAGNOSIS — H57.89 THYROID EYE DISEASE: Primary | ICD-10-CM

## 2025-04-08 ASSESSMENT — VISUAL ACUITY
CORRECTION_TYPE: GLASSES
OS_CC: TARS
OD_CC: 20/20
OD_CC+: -1
METHOD: SNELLEN - LINEAR

## 2025-04-08 ASSESSMENT — TONOMETRY
IOP_METHOD: ICARE
OD_IOP_MMHG: 12
OS_IOP_MMHG: TARS

## 2025-04-08 ASSESSMENT — REFRACTION_WEARINGRX
OD_CYLINDER: SPHERE
OD_SPHERE: +1.00
SPECS_TYPE: SVL
OS_SPHERE: +1.00
OS_CYLINDER: SPHERE

## 2025-04-08 NOTE — PATIENT INSTRUCTIONS
"- Apply warm compresses for 1 minute two to three times a day until your bruising has resolved. You can continue this for one more month.   - Apply the prescribed ointment to your incision three times a day and a 1/2\" strip into your left eye at bedtime. When you run out, you can start using Aquaphor or Vaseline to the incision at bedtime until it is smooth.    - The neomycin/polymixin/dexamethasone drop should be stopped at day 10.   - If you have symptoms of eye irritation, it is good to use over the counter artificial tears. Good brands include Refresh, Blink, and Systane. Do NOT get drops that are for \"red eyes.\"   "

## 2025-04-08 NOTE — NURSING NOTE
Chief Complaints and History of Present Illnesses   Patient presents with    Post Op (Ophthalmology) Left Eye     POD5 s/p Left lower eyelid retraction repair with acellular dermis and temporary tarsorrhaphy 4/3/25     Chief Complaint(s) and History of Present Illness(es)       Post Op (Ophthalmology) Left Eye              Associated symptoms: Negative for eye pain    Treatments tried: no treatments    Pain scale: 0/10    Comments: POD5 s/p Left lower eyelid retraction repair with acellular dermis and temporary tarsorrhaphy 4/3/25              Comments    Pt feels left eye is doing well.   Denies eye pain or discomfort.   No new concerns today.    Ocular Meds:   Erythromycin once daily left eye     Uday Ho 10:17 AM April 8, 2025

## 2025-04-08 NOTE — PROGRESS NOTES
"Chief Complaint(s) and History of Present Illness(es)     Post Op (Ophthalmology) Left Eye            Associated symptoms: Negative for eye pain    Treatments tried: no treatments    Pain scale: 0/10    Comments: POD5 s/p Left lower eyelid retraction repair with acellular   dermis and temporary tarsorrhaphy 4/3/25          Comments    Pt feels left eye is doing well.   Denies eye pain or discomfort.   No new concerns today.    Ocular Meds:   Erythromycin once daily left eye     Uday Ho 10:17 AM April 8, 2025                 Doing well. Pete suture removed. Mild inferior injection. No pain.    Patient Instructions   - Apply warm compresses for 1 minute two to three times a day until your bruising has resolved. You can continue this for one more month.   - Apply the prescribed ointment to your incision three times a day and a 1/2\" strip into your left eye at bedtime. When you run out, you can start using Aquaphor or Vaseline to the incision at bedtime until it is smooth.    - The neomycin/polymixin/dexamethasone drop should be stopped at day 10.   - If you have symptoms of eye irritation, it is good to use over the counter artificial tears. Good brands include Refresh, Blink, and Systane. Do NOT get drops that are for \"red eyes.\"   Return in about 2 months (around 6/8/2025).      Attending Physician Attestation: Complete documentation of historical and exam elements from today's encounter can be found in the full encounter summary report (not reduplicated in this progress note). I personally obtained the chief complaint(s) and history of present illness. I confirmed and edited as necessary the review of systems, past medical/surgical history, family history, social history, and examination findings as documented by others; and I examined the patient myself. I personally reviewed the relevant tests, images, and reports as documented above. I formulated and edited as necessary the assessment and plan and discussed the " findings and management plan with the patient and family. I personally reviewed the ophthalmic test(s) associated with this encounter, agree with the interpretation(s) as documented by the resident/fellow, and have edited the corresponding report(s) as necessary. Wiliam Serna MD

## 2025-04-08 NOTE — TELEPHONE ENCOUNTER
Spoke with patient regarding scheduling a POST-OP to Return in about 2 months (around 6/8/2025). DOS 4/3/25. Scheduled patient accordingly and sent printed AVS to confirmed address.-Appt Per PT

## 2025-04-26 ENCOUNTER — HOSPITAL ENCOUNTER (EMERGENCY)
Facility: HOSPITAL | Age: 40
Discharge: HOME | End: 2025-04-26
Attending: EMERGENCY MEDICINE | Admitting: EMERGENCY MEDICINE
Payer: COMMERCIAL

## 2025-04-26 VITALS
BODY MASS INDEX: 31.51 KG/M2 | SYSTOLIC BLOOD PRESSURE: 115 MMHG | HEART RATE: 70 BPM | OXYGEN SATURATION: 100 % | DIASTOLIC BLOOD PRESSURE: 67 MMHG | RESPIRATION RATE: 16 BRPM | WEIGHT: 183.6 LBS | TEMPERATURE: 97.9 F

## 2025-04-26 DIAGNOSIS — K92.0 HEMATEMESIS, UNSPECIFIED WHETHER NAUSEA PRESENT: ICD-10-CM

## 2025-04-26 LAB
ABO + RH BLD: NORMAL
ALBUMIN SERPL BCG-MCNC: 4.3 G/DL (ref 3.5–5.2)
ALP SERPL-CCNC: 78 U/L (ref 40–150)
ALT SERPL W P-5'-P-CCNC: 10 U/L (ref 0–50)
ANION GAP SERPL CALCULATED.3IONS-SCNC: 10 MMOL/L (ref 7–15)
AST SERPL W P-5'-P-CCNC: 15 U/L (ref 0–45)
BASOPHILS # BLD AUTO: 0 10E3/UL (ref 0–0.2)
BASOPHILS NFR BLD AUTO: 0 %
BILIRUB SERPL-MCNC: 0.6 MG/DL
BLD GP AB SCN SERPL QL: NEGATIVE
BUN SERPL-MCNC: 8.6 MG/DL (ref 6–20)
CALCIUM SERPL-MCNC: 9.2 MG/DL (ref 8.8–10.4)
CHLORIDE SERPL-SCNC: 105 MMOL/L (ref 98–107)
CREAT SERPL-MCNC: 0.73 MG/DL (ref 0.51–0.95)
EGFRCR SERPLBLD CKD-EPI 2021: >90 ML/MIN/1.73M2
EOSINOPHIL # BLD AUTO: 0 10E3/UL (ref 0–0.7)
EOSINOPHIL NFR BLD AUTO: 0 %
ERYTHROCYTE [DISTWIDTH] IN BLOOD BY AUTOMATED COUNT: 11.4 % (ref 10–15)
GLUCOSE SERPL-MCNC: 85 MG/DL (ref 70–99)
HCO3 SERPL-SCNC: 27 MMOL/L (ref 22–29)
HCT VFR BLD AUTO: 33.7 % (ref 35–47)
HGB BLD-MCNC: 11.5 G/DL (ref 11.7–15.7)
IMM GRANULOCYTES # BLD: 0 10E3/UL
IMM GRANULOCYTES NFR BLD: 0 %
INR PPP: 1.07 (ref 0.85–1.15)
LIPASE SERPL-CCNC: 43 U/L (ref 13–60)
LYMPHOCYTES # BLD AUTO: 3 10E3/UL (ref 0.8–5.3)
LYMPHOCYTES NFR BLD AUTO: 33 %
MCH RBC QN AUTO: 27.6 PG (ref 26.5–33)
MCHC RBC AUTO-ENTMCNC: 34.1 G/DL (ref 31.5–36.5)
MCV RBC AUTO: 81 FL (ref 78–100)
MONOCYTES # BLD AUTO: 0.9 10E3/UL (ref 0–1.3)
MONOCYTES NFR BLD AUTO: 10 %
NEUTROPHILS # BLD AUTO: 5.1 10E3/UL (ref 1.6–8.3)
NEUTROPHILS NFR BLD AUTO: 57 %
NRBC # BLD AUTO: 0 10E3/UL
NRBC BLD AUTO-RTO: 0 /100
PLATELET # BLD AUTO: 409 10E3/UL (ref 150–450)
POTASSIUM SERPL-SCNC: 3.4 MMOL/L (ref 3.4–5.3)
PROT SERPL-MCNC: 7.5 G/DL (ref 6.4–8.3)
RBC # BLD AUTO: 4.17 10E6/UL (ref 3.8–5.2)
SODIUM SERPL-SCNC: 142 MMOL/L (ref 135–145)
SPECIMEN EXP DATE BLD: NORMAL
WBC # BLD AUTO: 9 10E3/UL (ref 4–11)

## 2025-04-26 PROCEDURE — 250N000011 HC RX IP 250 OP 636: Performed by: EMERGENCY MEDICINE

## 2025-04-26 PROCEDURE — 86901 BLOOD TYPING SEROLOGIC RH(D): CPT | Performed by: EMERGENCY MEDICINE

## 2025-04-26 PROCEDURE — 85610 PROTHROMBIN TIME: CPT | Performed by: EMERGENCY MEDICINE

## 2025-04-26 PROCEDURE — 96374 THER/PROPH/DIAG INJ IV PUSH: CPT | Performed by: EMERGENCY MEDICINE

## 2025-04-26 PROCEDURE — 99284 EMERGENCY DEPT VISIT MOD MDM: CPT | Mod: 25 | Performed by: EMERGENCY MEDICINE

## 2025-04-26 PROCEDURE — 83690 ASSAY OF LIPASE: CPT | Performed by: EMERGENCY MEDICINE

## 2025-04-26 PROCEDURE — 80053 COMPREHEN METABOLIC PANEL: CPT | Performed by: EMERGENCY MEDICINE

## 2025-04-26 PROCEDURE — 85004 AUTOMATED DIFF WBC COUNT: CPT | Performed by: EMERGENCY MEDICINE

## 2025-04-26 PROCEDURE — 36415 COLL VENOUS BLD VENIPUNCTURE: CPT | Performed by: EMERGENCY MEDICINE

## 2025-04-26 RX ORDER — PANTOPRAZOLE SODIUM 40 MG/1
40 TABLET, DELAYED RELEASE ORAL DAILY
Qty: 30 TABLET | Refills: 0 | Status: SHIPPED | OUTPATIENT
Start: 2025-04-26 | End: 2025-05-26

## 2025-04-26 RX ADMIN — PANTOPRAZOLE SODIUM 40 MG: 40 INJECTION, POWDER, FOR SOLUTION INTRAVENOUS at 20:43

## 2025-04-26 ASSESSMENT — COLUMBIA-SUICIDE SEVERITY RATING SCALE - C-SSRS
6. HAVE YOU EVER DONE ANYTHING, STARTED TO DO ANYTHING, OR PREPARED TO DO ANYTHING TO END YOUR LIFE?: NO
1. IN THE PAST MONTH, HAVE YOU WISHED YOU WERE DEAD OR WISHED YOU COULD GO TO SLEEP AND NOT WAKE UP?: NO
2. HAVE YOU ACTUALLY HAD ANY THOUGHTS OF KILLING YOURSELF IN THE PAST MONTH?: NO

## 2025-04-26 ASSESSMENT — ACTIVITIES OF DAILY LIVING (ADL)
ADLS_ACUITY_SCORE: 48
ADLS_ACUITY_SCORE: 48

## 2025-04-27 NOTE — ED PROVIDER NOTES
EMERGENCY DEPARTMENT ENCOUNTER      NAME: Lanie Manuel  AGE: 39 year old female  YOB: 1985  MRN: [id number]  EVALUATION DATE & TIME: 4/26/2025  8:01 PM    PCP: [facility]    ED PROVIDER: Katarzyna Lynch MD    Chief Complaint   Patient presents with    Abdominal Pain    GI Bleeding         FINAL IMPRESSION:  1. Hematemesis, unspecified whether nausea present          ED COURSE & MEDICAL DECISION MAKING:    Pertinent Labs & Imaging studies reviewed. (See chart for details)  39 year old female with history of Graves' disease, sickle cell trait who presents to the Emergency Department for evaluation of hematemesis having 1 episode of bright red vomiting this morning at 6 AM.  She has not had any ongoing nausea, vomiting in the last 14 hours since her episode and my assessment.  Patient complains of a small amount of lower abdominal pain, but this is also spontaneously improved.  Her vitals are stable, she does not have any history of previous GI tract bleeding, liver disease, alcohol abuse and is not anticoagulated.  She typically does not get much heartburn or reflux to suspect underlying esophagitis, gastritis or peptic ulcer.  She denies any liver disease, and does not describe any forceful retching or vomiting prior to this episode to suspect a Laurel-Jansen tear undersign esophageal varices.  Patient has had a scant amount of bright red blood per rectum when she wipes on the toilet paper only that has been intermittent for some several weeks, but this has not been what brought her into the ER tonight.  Her exam again is quite unremarkable and reassuring.    Patient placed on monitor, IV established and blood obtained.  Given IV PPI.  CBC, CMP, lipase, type and screen notable for hemoglobin 11.5 which is actually up from her previous.  Findings were discussed with patient.  Reassuring.  However given her report of upper GI bleed offered hospital admission which she declines.   Given outpatient GI referral for urgent follow-up to consider upper endoscopy, warning signs return to ED discussed.      ED Course as of 04/27/25 0021   Sat Apr 26, 2025 2051 Hemoglobin(!): 11.5  10.3-10.5 prev 1yr ago       Medical Decision Making  I reviewed the EMR: Prior Labs: Previous hemoglobin  Discharge. I prescribed additional prescription strength medication(s) as charted. See documentation for any additional details.    MIPS (CTPE, Dental pain, Tabares, Sinusitis, Asthma/COPD, Head Trauma): Not Applicable    SEPSIS: None          At the conclusion of the encounter I discussed the results of all of the tests and the disposition. The questions were answered. The patient or family acknowledged understanding and was agreeable with the care plan.      MEDICATIONS GIVEN IN THE EMERGENCY:  Medications   pantoprazole (PROTONIX) IV push injection 40 mg (40 mg Intravenous $Given 4/26/25 2043)       NEW PRESCRIPTIONS STARTED AT TODAY'S ER VISIT  Discharge Medication List as of 4/26/2025  9:57 PM        START taking these medications    Details   pantoprazole (PROTONIX) 40 MG EC tablet Take 1 tablet (40 mg) by mouth daily for 30 doses., Disp-30 tablet, R-0, E-Prescribe                =================================================================    HPI    Patient information was obtained from: patient     Use of Intrepreter: N/A        Lanie COBB Kaleb is a 39 year old female with pertinent medical history of graves disease, sickle cell trait, anemia, and thyroid cancer who presents for evaluation of abdominal pain.    The patient woke up this morning with LLQ abdominal pain that has been constant all day. She notes that she became nauseous and vomited bright red blood around 6 AM. She has not vomited since. She notes that she had some bright red blood when she wiped after having a bowel movement last week multiple times. She was sick with a cough last week and coughed up some sputum that was tinged with blood.  She is concerned about the blood. She has no history of GI bleeds, ulcers, or heart burn. The patient is not on blood thinners. She does not drink alcohol. She has had a  and panniculectomy but otherwise no abdominal surgeries.       PAST MEDICAL HISTORY:  Past Medical History:   Diagnosis Date    Anemia     Elevated platelet count     Graves disease 2016    History of thyroid cancer     Sickle cell trait        PAST SURGICAL HISTORY:  Past Surgical History:   Procedure Laterality Date     SECTION      OPTICAL TRACKING SYSTEM DECOMPRESSION ORBIT Bilateral 2024    Procedure: Bilateral lateral orbital decompression with intraoperative navigation;  Surgeon: Wiliam Serna MD;  Location: [facility] OR    REPAIR RETRACTION LID Right 2024    Procedure: Right lower eyelid retraction repair with acellular dermis and temporary tarsorrhaphy;  Surgeon: Wiliam Serna MD;  Location: [facility] OR    REPAIR RETRACTION LID Left 4/3/2025    Procedure: Left lower eyelid retraction repair with acellular dermis and temporary tarsorrhaphy;  Surgeon: Wiliam Serna MD;  Location: [facility] OR    SONOPET N/A 2024    Procedure: Sonopet;  Surgeon: Wiliam Serna MD;  Location: [facility] OR    THYROIDECTOMY          CURRENT MEDICATIONS:    Prior to Admission Medications   Prescriptions Last Dose Informant Patient Reported? Taking?   WEGOVY 2.4 MG/0.75ML pen   Yes No   Sig: Inject 2.4 mg subcutaneously once a week.   acetaminophen (TYLENOL) 500 MG tablet   No No   Sig: Take 1-2 tablets (500-1,000 mg) by mouth every 6 hours as needed for mild pain or fever   azelaic acid (FINACIA) 15 % external gel   Yes No   Sig: Apply topically every morning   erythromycin (ROMYCIN) 5 MG/GM ophthalmic ointment   No No   Sig: Apply antibiotic ointment to all sutures three times a day, and 1/2 inch strip into the operated eye(s) at night. Use until follow up.   erythromycin (ROMYCIN) 5 MG/GM ophthalmic ointment   No No   Sig:  Apply antibiotic ointment to all sutures three times a day, and 1/2 inch strip into the operated eye(s) at night. Use until follow up.   levothyroxine (SYNTHROID/LEVOTHROID) 112 MCG tablet   Yes No   Sig: Take 112 mcg by mouth every morning (before breakfast).   neomycin-polymixin-dexAMETHasone (MAXITROL) 0.1 % ophthalmic suspension   No No   Sig: Please instill one drop into the operative eye(s) three times daily for 10 days. Please start on Friday 1/3/25 after patch removal.   tretinoin (RETIN-A) 0.025 % external cream   Yes No   Sig: Apply topically at bedtime      Facility-Administered Medications: None       ALLERGIES:  No Known Allergies    FAMILY HISTORY:  Family History   Problem Relation Age of Onset    Glaucoma No family hx of     Anesthesia Reaction No family hx of     Thrombosis No family hx of        SOCIAL HISTORY:  Social History     Tobacco Use    Smoking status: Never    Smokeless tobacco: Never   Substance Use Topics    Alcohol use: No    Drug use: Not Currently        VITALS:  Patient Vitals for the past 24 hrs:   BP Temp Temp src Pulse Resp SpO2 Weight   04/26/25 2146 115/67 -- -- 70 16 100 % --   04/26/25 2100 109/57 -- -- 73 -- 100 % --   04/26/25 1950 129/77 97.9  F (36.6  C) Temporal 77 16 99 % 83.3 kg (183 lb 9.6 oz)       PHYSICAL EXAM    General Appearance: Well-appearing, well-nourished, no acute distress   Head:  Normocephalic  ENT:  membranes are moist without pallor  Neck:  Supple  Cardio:  Regular rate and rhythm, no murmur/gallop/rub  Pulm:  No respiratory distress, clear to auscultation bilaterally  Back:  No CVA tenderness, normal ROM  Abdomen:  Old surgical scars, not able to reproduce any abdominal tenderness, Soft, non distended,no rebound or guarding.  Extremities: Normal gait  Skin:  Skin warm, dry, no rashes  Neuro:  Alert and oriented ×3     RADIOLOGY/LABS:  Reviewed all pertinent imaging. Please see official radiology report. All pertinent labs reviewed and interpreted.     Results for orders placed or performed during the hospital encounter of 04/26/25   Result Value Ref Range    INR 1.07 0.85 - 1.15   Comprehensive metabolic panel   Result Value Ref Range    Sodium 142 135 - 145 mmol/L    Potassium 3.4 3.4 - 5.3 mmol/L    Carbon Dioxide (CO2) 27 22 - 29 mmol/L    Anion Gap 10 7 - 15 mmol/L    Urea Nitrogen 8.6 6.0 - 20.0 mg/dL    Creatinine 0.73 0.51 - 0.95 mg/dL    GFR Estimate >90 >60 mL/min/1.73m2    Calcium 9.2 8.8 - 10.4 mg/dL    Chloride 105 98 - 107 mmol/L    Glucose 85 70 - 99 mg/dL    Alkaline Phosphatase 78 40 - 150 U/L    AST 15 0 - 45 U/L    ALT 10 0 - 50 U/L    Protein Total 7.5 6.4 - 8.3 g/dL    Albumin 4.3 3.5 - 5.2 g/dL    Bilirubin Total 0.6 <=1.2 mg/dL   Result Value Ref Range    Lipase 43 13 - 60 U/L   CBC with platelets and differential   Result Value Ref Range    WBC Count 9.0 4.0 - 11.0 10e3/uL    RBC Count 4.17 3.80 - 5.20 10e6/uL    Hemoglobin 11.5 (L) 11.7 - 15.7 g/dL    Hematocrit 33.7 (L) 35.0 - 47.0 %    MCV 81 78 - 100 fL    MCH 27.6 26.5 - 33.0 pg    MCHC 34.1 31.5 - 36.5 g/dL    RDW 11.4 10.0 - 15.0 %    Platelet Count 409 150 - 450 10e3/uL    % Neutrophils 57 %    % Lymphocytes 33 %    % Monocytes 10 %    % Eosinophils 0 %    % Basophils 0 %    % Immature Granulocytes 0 %    NRBCs per 100 WBC 0 <1 /100    Absolute Neutrophils 5.1 1.6 - 8.3 10e3/uL    Absolute Lymphocytes 3.0 0.8 - 5.3 10e3/uL    Absolute Monocytes 0.9 0.0 - 1.3 10e3/uL    Absolute Eosinophils 0.0 0.0 - 0.7 10e3/uL    Absolute Basophils 0.0 0.0 - 0.2 10e3/uL    Absolute Immature Granulocytes 0.0 <=0.4 10e3/uL    Absolute NRBCs 0.0 10e3/uL   Adult Type and Screen   Result Value Ref Range    ABO/RH(D) O POS     Antibody Screen Negative Negative    SPECIMEN EXPIRATION DATE [id number]        The creation of this record is based on the scribe s observations of the work being performed by Katarzyna Lynch MD and the provider s statements to them. It was created on her behalf by Lizzette  Jef a trained medical scribe. This document has been checked and approved by the attending provider.    Katarzyna Lynch MD  Emergency Medicine  [facility] EMERGENCY DEPARTMENT  [address] [place] [phone]  Dept: [phone]     Katarzyna Lynch MD  04/27/25 0024

## 2025-04-27 NOTE — DISCHARGE INSTRUCTIONS
Your blood work today is reassuring.  You declined hospital admission.  Take antacid medication as prescribed.  Follow-up with GI as an outpatient to consider upper endoscopy.  Return to the ER for the warning signs discussed, recurrent bloody vomit.

## 2025-04-27 NOTE — ED TRIAGE NOTES
Pt reports abd pain since this morning, pt took APAP PTA.  Pt reports had an emesis this evening and noted bright red blood.  Pt reports last week also having BRBPR. Denies blood thinning medications.     Triage Assessment (Adult)       Row Name 04/26/25 1950          Triage Assessment    Airway WDL WDL        Respiratory WDL    Respiratory WDL WDL        Skin Circulation/Temperature WDL    Skin Circulation/Temperature WDL WDL        Cardiac WDL    Cardiac WDL WDL        Peripheral/Neurovascular WDL    Peripheral Neurovascular WDL WDL        Cognitive/Neuro/Behavioral WDL    Cognitive/Neuro/Behavioral WDL WDL

## 2025-06-17 ENCOUNTER — OFFICE VISIT (OUTPATIENT)
Dept: OPHTHALMOLOGY | Facility: CLINIC | Age: 40
End: 2025-06-17
Payer: COMMERCIAL

## 2025-06-17 DIAGNOSIS — Z98.890 POSTOPERATIVE EYE STATE: Primary | ICD-10-CM

## 2025-06-17 PROCEDURE — 99024 POSTOP FOLLOW-UP VISIT: CPT | Performed by: STUDENT IN AN ORGANIZED HEALTH CARE EDUCATION/TRAINING PROGRAM

## 2025-06-17 ASSESSMENT — VISUAL ACUITY
CORRECTION_TYPE: GLASSES
METHOD: SNELLEN - LINEAR
OS_SC+: +1
OS_SC: 20/60
OD_SC+: -3
OS_CC: 20/25
OD_SC: 20/25

## 2025-06-17 ASSESSMENT — MARGIN REFLEX DISTANCE
OD_MRD1: 4
OD_MRD2: 4
OS_MRD2: 4
OS_MRD1: 4

## 2025-06-17 ASSESSMENT — TONOMETRY
OS_IOP_MMHG: 17
OD_IOP_MMHG: 18
IOP_METHOD: ICARE

## 2025-06-17 ASSESSMENT — EXTERNAL EXAM - RIGHT EYE: OD_EXAM: NORMAL

## 2025-06-17 ASSESSMENT — EXTERNAL EXAM - LEFT EYE: OS_EXAM: NORMAL

## 2025-06-17 ASSESSMENT — SLIT LAMP EXAM - LIDS: COMMENTS: NORMAL

## 2025-06-17 NOTE — PROGRESS NOTES
Chief Complaints and History of Present Illnesses   Patient presents with    Post Op (Ophthalmology) Left Eye     Left lower eyelid retraction repair with acellular dermis and temporary tarsorrhaphy     Chief Complaint(s) and History of Present Illness(es)     Post Op (Ophthalmology) Left Eye    In left eye.  This started 10.5 weeks ago.  Presenting in central vision.    Quality: Sharp vision .  Since onset it is stable.  Associated symptoms   include Negative for eye pain and swelling.  Treatments tried include no   treatments.  Pain was noted as 0/10. Additional comments: Left lower   eyelid retraction repair with acellular dermis and temporary tarsorrhaphy    Priyanka Hatfield COT June 17, 2025 11:03 AM       Assessment & Plan     Lanie Manuel is a 39 year old female with the following diagnoses:   1. Postoperative eye state       Lanie Manuel is 2 months status post   Left lower eyelid retraction repair with conjunctivoplasty, Alloderm (acellular human dermal matrix) graft, lateral canthopexy and temporary Frost suture tarsorrhaphy.     Left lower eyelid healed appropriately, no inferior scleral show remains.   No lag each eye.   Both eyes are comfortable    I have recommended:  - Artificial tears and warm compresses for comfort as needed  - Follow up with oculoplastics as needed    Patti Perry MD  Oculoplastic Surgery Fellow, [facility]    I did not see Lanie but agree with the impression and plan.

## (undated) DEVICE — LINEN TOWEL PACK X5 5464

## (undated) DEVICE — SU PLAIN 6-0 G-1DA 18" 770G

## (undated) DEVICE — SYR EAR BULB 3OZ 0035830

## (undated) DEVICE — ESU HIGH TEMP LOOP TIP AA03

## (undated) DEVICE — TUBING SUCTION MEDI-VAC 1/4"X20' N620A

## (undated) DEVICE — PACK MINOR EYE CUSTOM ASC

## (undated) DEVICE — SUCTION MANIFOLD NEPTUNE 2 SYS 4 PORT 0702-020-000

## (undated) DEVICE — SOL WATER IRRIG 500ML BOTTLE 2F7113

## (undated) DEVICE — CASSETTE SONOPET IRRIG SUCTION STRL 5500-573-000

## (undated) DEVICE — SU SILK 4-0 G-3DA 18" 783G

## (undated) DEVICE — SOL NACL 0.9% IRRIG 500ML BOTTLE 2F7123

## (undated) DEVICE — TRACKER NAVIGATION CRANIALMASK 6000-390-000

## (undated) DEVICE — EYE PREP BETADINE 5% SOLUTION 30ML 0065-0411-30

## (undated) DEVICE — ESU PENCIL SMOKE EVAC W/ROCKER SWITCH 0703-047-000

## (undated) DEVICE — SOL NACL 0.9% INJ 1000ML BAG 2B1324X

## (undated) DEVICE — ESU GROUND PAD ADULT W/CORD E7507

## (undated) DEVICE — NDL ANGIOCATH 14GA 1.25" 4048

## (undated) DEVICE — LINER SUCTION US ASPIRATOR SONOPET CANISTER LF 5450-850-002

## (undated) DEVICE — BONE WAX 2.5GM W31G

## (undated) DEVICE — TIP ASP SONOPET IQ 3.15X2.58MMX12CM APEX 360 5500-25B-312

## (undated) DEVICE — BATTERY STRYKER NAVIGATION SYSTEM 6000-006-000

## (undated) DEVICE — GLOVE BIOGEL PI MICRO SZ 7.5 48575

## (undated) DEVICE — PEN MARKING SKIN TYCO DEVON DUAL TIP 31145868

## (undated) DEVICE — SYR 03ML LL W/O NDL 309657

## (undated) DEVICE — ESU NDL COLORADO MICRO 3CM STR N103A

## (undated) DEVICE — SU VICRYL 5-0 S-14DA 18" J571G

## (undated) DEVICE — BLADE KNIFE SURG 15 371115

## (undated) DEVICE — EYE KNIFE CRESCENT 55DEG 373807

## (undated) DEVICE — DRSG GAUZE 4X4" 3033

## (undated) RX ORDER — OXYCODONE HYDROCHLORIDE 5 MG/1
TABLET ORAL
Status: DISPENSED
Start: 2024-07-25

## (undated) RX ORDER — FENTANYL CITRATE 50 UG/ML
INJECTION, SOLUTION INTRAMUSCULAR; INTRAVENOUS
Status: DISPENSED
Start: 2024-12-31

## (undated) RX ORDER — PROPOFOL 10 MG/ML
INJECTION, EMULSION INTRAVENOUS
Status: DISPENSED
Start: 2024-12-31

## (undated) RX ORDER — FENTANYL CITRATE 50 UG/ML
INJECTION, SOLUTION INTRAMUSCULAR; INTRAVENOUS
Status: DISPENSED
Start: 2024-07-25

## (undated) RX ORDER — TRANEXAMIC ACID 100 MG/ML
INJECTION, SOLUTION INTRAVENOUS
Status: DISPENSED
Start: 2025-04-03

## (undated) RX ORDER — ERYTHROMYCIN 5 MG/G
OINTMENT OPHTHALMIC
Status: DISPENSED
Start: 2024-12-31

## (undated) RX ORDER — TRANEXAMIC ACID 100 MG/ML
INJECTION, SOLUTION INTRAVENOUS
Status: DISPENSED
Start: 2024-07-25

## (undated) RX ORDER — FENTANYL CITRATE 50 UG/ML
INJECTION, SOLUTION INTRAMUSCULAR; INTRAVENOUS
Status: DISPENSED
Start: 2025-04-03

## (undated) RX ORDER — FENTANYL CITRATE-0.9 % NACL/PF 10 MCG/ML
PLASTIC BAG, INJECTION (ML) INTRAVENOUS
Status: DISPENSED
Start: 2024-07-25

## (undated) RX ORDER — DEXAMETHASONE SODIUM PHOSPHATE 10 MG/ML
INJECTION, SOLUTION INTRAMUSCULAR; INTRAVENOUS
Status: DISPENSED
Start: 2024-07-25

## (undated) RX ORDER — CEFAZOLIN SODIUM 2 G/50ML
SOLUTION INTRAVENOUS
Status: DISPENSED
Start: 2025-04-03

## (undated) RX ORDER — GENTAMICIN 40 MG/ML
INJECTION, SOLUTION INTRAMUSCULAR; INTRAVENOUS
Status: DISPENSED
Start: 2024-12-31

## (undated) RX ORDER — ACETAMINOPHEN 325 MG/1
TABLET ORAL
Status: DISPENSED
Start: 2025-04-03

## (undated) RX ORDER — TETRACAINE HYDROCHLORIDE 5 MG/ML
SOLUTION OPHTHALMIC
Status: DISPENSED
Start: 2024-12-31

## (undated) RX ORDER — ACETAMINOPHEN 325 MG/1
TABLET ORAL
Status: DISPENSED
Start: 2024-12-31

## (undated) RX ORDER — GLYCOPYRROLATE 0.2 MG/ML
INJECTION INTRAMUSCULAR; INTRAVENOUS
Status: DISPENSED
Start: 2024-07-25

## (undated) RX ORDER — DEXMEDETOMIDINE HYDROCHLORIDE 4 UG/ML
INJECTION, SOLUTION INTRAVENOUS
Status: DISPENSED
Start: 2024-12-31

## (undated) RX ORDER — LIDOCAINE HYDROCHLORIDE AND EPINEPHRINE 10; 10 MG/ML; UG/ML
INJECTION, SOLUTION INFILTRATION; PERINEURAL
Status: DISPENSED
Start: 2024-12-31

## (undated) RX ORDER — PROPOFOL 10 MG/ML
INJECTION, EMULSION INTRAVENOUS
Status: DISPENSED
Start: 2024-07-25

## (undated) RX ORDER — OXYCODONE HYDROCHLORIDE 5 MG/1
TABLET ORAL
Status: DISPENSED
Start: 2025-04-03

## (undated) RX ORDER — ACETAMINOPHEN 325 MG/1
TABLET ORAL
Status: DISPENSED
Start: 2024-07-25

## (undated) RX ORDER — TRANEXAMIC ACID 100 MG/ML
INJECTION, SOLUTION INTRAVENOUS
Status: DISPENSED
Start: 2024-12-31

## (undated) RX ORDER — TRIAMCINOLONE ACETONIDE 40 MG/ML
INJECTION, SUSPENSION INTRA-ARTICULAR; INTRAMUSCULAR
Status: DISPENSED
Start: 2024-07-25

## (undated) RX ORDER — HYDROMORPHONE HYDROCHLORIDE 1 MG/ML
INJECTION, SOLUTION INTRAMUSCULAR; INTRAVENOUS; SUBCUTANEOUS
Status: DISPENSED
Start: 2024-07-25

## (undated) RX ORDER — BUPIVACAINE HYDROCHLORIDE 5 MG/ML
INJECTION, SOLUTION EPIDURAL; INTRACAUDAL
Status: DISPENSED
Start: 2024-12-31

## (undated) RX ORDER — GLYCOPYRROLATE 0.2 MG/ML
INJECTION, SOLUTION INTRAMUSCULAR; INTRAVENOUS
Status: DISPENSED
Start: 2024-12-31

## (undated) RX ORDER — ONDANSETRON 2 MG/ML
INJECTION INTRAMUSCULAR; INTRAVENOUS
Status: DISPENSED
Start: 2024-12-31

## (undated) RX ORDER — OXYCODONE HYDROCHLORIDE 5 MG/1
TABLET ORAL
Status: DISPENSED
Start: 2024-12-31